# Patient Record
Sex: MALE | Race: WHITE | NOT HISPANIC OR LATINO
[De-identification: names, ages, dates, MRNs, and addresses within clinical notes are randomized per-mention and may not be internally consistent; named-entity substitution may affect disease eponyms.]

---

## 2018-06-05 ENCOUNTER — APPOINTMENT (OUTPATIENT)
Dept: MRI IMAGING | Facility: IMAGING CENTER | Age: 78
End: 2018-06-05

## 2018-06-05 ENCOUNTER — OUTPATIENT (OUTPATIENT)
Dept: OUTPATIENT SERVICES | Facility: HOSPITAL | Age: 78
LOS: 1 days | End: 2018-06-05

## 2018-06-05 DIAGNOSIS — Z98.890 OTHER SPECIFIED POSTPROCEDURAL STATES: ICD-10-CM

## 2018-06-05 DIAGNOSIS — I77.810 THORACIC AORTIC ECTASIA: ICD-10-CM

## 2018-06-05 DIAGNOSIS — Z98.89 OTHER SPECIFIED POSTPROCEDURAL STATES: Chronic | ICD-10-CM

## 2018-06-07 ENCOUNTER — OTHER (OUTPATIENT)
Age: 78
End: 2018-06-07

## 2018-06-08 ENCOUNTER — APPOINTMENT (OUTPATIENT)
Dept: CARDIOTHORACIC SURGERY | Facility: CLINIC | Age: 78
End: 2018-06-08
Payer: MEDICARE

## 2018-08-10 ENCOUNTER — APPOINTMENT (OUTPATIENT)
Dept: CARDIOTHORACIC SURGERY | Facility: CLINIC | Age: 78
End: 2018-08-10
Payer: MEDICARE

## 2018-08-10 VITALS
BODY MASS INDEX: 22.6 KG/M2 | HEIGHT: 67 IN | OXYGEN SATURATION: 95 % | DIASTOLIC BLOOD PRESSURE: 68 MMHG | RESPIRATION RATE: 16 BRPM | WEIGHT: 144 LBS | HEART RATE: 76 BPM | SYSTOLIC BLOOD PRESSURE: 149 MMHG

## 2018-08-10 PROCEDURE — 99213 OFFICE O/P EST LOW 20 MIN: CPT

## 2018-10-11 ENCOUNTER — NON-APPOINTMENT (OUTPATIENT)
Age: 78
End: 2018-10-11

## 2018-10-11 ENCOUNTER — APPOINTMENT (OUTPATIENT)
Dept: CARDIOLOGY | Facility: CLINIC | Age: 78
End: 2018-10-11
Payer: MEDICARE

## 2018-10-11 VITALS
SYSTOLIC BLOOD PRESSURE: 140 MMHG | HEART RATE: 57 BPM | DIASTOLIC BLOOD PRESSURE: 58 MMHG | BODY MASS INDEX: 21.77 KG/M2 | WEIGHT: 139 LBS | OXYGEN SATURATION: 6 %

## 2018-10-11 VITALS — DIASTOLIC BLOOD PRESSURE: 56 MMHG | SYSTOLIC BLOOD PRESSURE: 124 MMHG

## 2018-10-11 DIAGNOSIS — R94.31 ABNORMAL ELECTROCARDIOGRAM [ECG] [EKG]: ICD-10-CM

## 2018-10-11 PROCEDURE — 93000 ELECTROCARDIOGRAM COMPLETE: CPT

## 2018-10-11 PROCEDURE — 99204 OFFICE O/P NEW MOD 45 MIN: CPT

## 2018-10-14 ENCOUNTER — NON-APPOINTMENT (OUTPATIENT)
Age: 78
End: 2018-10-14

## 2018-10-15 ENCOUNTER — MESSAGE (OUTPATIENT)
Age: 78
End: 2018-10-15

## 2018-10-19 ENCOUNTER — APPOINTMENT (OUTPATIENT)
Dept: CARDIOLOGY | Facility: CLINIC | Age: 78
End: 2018-10-19

## 2018-11-13 ENCOUNTER — APPOINTMENT (OUTPATIENT)
Dept: CARDIOLOGY | Facility: CLINIC | Age: 78
End: 2018-11-13
Payer: MEDICARE

## 2018-12-11 ENCOUNTER — LABORATORY RESULT (OUTPATIENT)
Age: 78
End: 2018-12-11

## 2018-12-11 ENCOUNTER — APPOINTMENT (OUTPATIENT)
Dept: CARDIOLOGY | Facility: CLINIC | Age: 78
End: 2018-12-11
Payer: MEDICARE

## 2018-12-11 ENCOUNTER — NON-APPOINTMENT (OUTPATIENT)
Age: 78
End: 2018-12-11

## 2018-12-11 VITALS
HEART RATE: 72 BPM | OXYGEN SATURATION: 97 % | BODY MASS INDEX: 22.87 KG/M2 | DIASTOLIC BLOOD PRESSURE: 80 MMHG | WEIGHT: 146 LBS | SYSTOLIC BLOOD PRESSURE: 162 MMHG

## 2018-12-11 DIAGNOSIS — R25.2 CRAMP AND SPASM: ICD-10-CM

## 2018-12-11 PROCEDURE — 36415 COLL VENOUS BLD VENIPUNCTURE: CPT

## 2018-12-11 PROCEDURE — 93306 TTE W/DOPPLER COMPLETE: CPT

## 2018-12-11 PROCEDURE — 99214 OFFICE O/P EST MOD 30 MIN: CPT | Mod: 25

## 2018-12-11 PROCEDURE — 93000 ELECTROCARDIOGRAM COMPLETE: CPT

## 2018-12-12 ENCOUNTER — MEDICATION RENEWAL (OUTPATIENT)
Age: 78
End: 2018-12-12

## 2018-12-13 ENCOUNTER — MESSAGE (OUTPATIENT)
Age: 78
End: 2018-12-13

## 2018-12-13 LAB
ALBUMIN SERPL ELPH-MCNC: 4.2 G/DL
ALP BLD-CCNC: 88 U/L
ALT SERPL-CCNC: 16 U/L
ANION GAP SERPL CALC-SCNC: 13 MMOL/L
AST SERPL-CCNC: 23 U/L
BASOPHILS # BLD AUTO: 0.02 K/UL
BASOPHILS NFR BLD AUTO: 0.2 %
BILIRUB SERPL-MCNC: 0.2 MG/DL
BUN SERPL-MCNC: 39 MG/DL
CALCIUM SERPL-MCNC: 9.8 MG/DL
CHLORIDE SERPL-SCNC: 108 MMOL/L
CHOLEST SERPL-MCNC: 226 MG/DL
CHOLEST/HDLC SERPL: 4.6 RATIO
CK SERPL-CCNC: 209 U/L
CO2 SERPL-SCNC: 22 MMOL/L
CREAT SERPL-MCNC: 2.01 MG/DL
EOSINOPHIL # BLD AUTO: 0.12 K/UL
EOSINOPHIL NFR BLD AUTO: 1.3 %
GLUCOSE SERPL-MCNC: 92 MG/DL
HCT VFR BLD CALC: 40.4 %
HDLC SERPL-MCNC: 49 MG/DL
HGB BLD-MCNC: 12.8 G/DL
IMM GRANULOCYTES NFR BLD AUTO: 0.2 %
LDLC SERPL CALC-MCNC: 138 MG/DL
LDLC SERPL DIRECT ASSAY-MCNC: 152 MG/DL
LYMPHOCYTES # BLD AUTO: 1.26 K/UL
LYMPHOCYTES NFR BLD AUTO: 13.5 %
MAN DIFF?: NORMAL
MCHC RBC-ENTMCNC: 31.2 PG
MCHC RBC-ENTMCNC: 31.7 GM/DL
MCV RBC AUTO: 98.5 FL
MONOCYTES # BLD AUTO: 0.65 K/UL
MONOCYTES NFR BLD AUTO: 7 %
NEUTROPHILS # BLD AUTO: 7.28 K/UL
NEUTROPHILS NFR BLD AUTO: 77.8 %
PLATELET # BLD AUTO: 160 K/UL
POTASSIUM SERPL-SCNC: 5.6 MMOL/L
PROT SERPL-MCNC: 6.9 G/DL
RBC # BLD: 4.1 M/UL
RBC # FLD: 13.3 %
SODIUM SERPL-SCNC: 143 MMOL/L
T3 SERPL-MCNC: 131 NG/DL
T3RU NFR SERPL: 1.13 INDEX
T4 FREE SERPL-MCNC: 1.2 NG/DL
T4 SERPL-MCNC: 8.2 UG/DL
TRIGL SERPL-MCNC: 193 MG/DL
TSH SERPL-ACNC: 2.5 UIU/ML
WBC # FLD AUTO: 9.35 K/UL

## 2018-12-24 ENCOUNTER — APPOINTMENT (OUTPATIENT)
Dept: CARDIOLOGY | Facility: CLINIC | Age: 78
End: 2018-12-24
Payer: MEDICARE

## 2018-12-24 VITALS
WEIGHT: 143 LBS | BODY MASS INDEX: 22.4 KG/M2 | HEART RATE: 73 BPM | DIASTOLIC BLOOD PRESSURE: 78 MMHG | SYSTOLIC BLOOD PRESSURE: 170 MMHG | OXYGEN SATURATION: 96 %

## 2018-12-24 PROCEDURE — 36415 COLL VENOUS BLD VENIPUNCTURE: CPT

## 2018-12-24 PROCEDURE — 99214 OFFICE O/P EST MOD 30 MIN: CPT

## 2018-12-25 NOTE — HISTORY OF PRESENT ILLNESS
[FreeTextEntry1] : On amlodipine.\par Systolic BPs start in the 160s and after several readings drop to the 130s.\par He denies chest pain, shortness of breath, and palpitations.\par

## 2018-12-28 LAB
ALBUMIN SERPL ELPH-MCNC: 4.1 G/DL
ALP BLD-CCNC: 86 U/L
ALT SERPL-CCNC: 16 U/L
ANION GAP SERPL CALC-SCNC: 11 MMOL/L
AST SERPL-CCNC: 18 U/L
BILIRUB SERPL-MCNC: 0.2 MG/DL
BUN SERPL-MCNC: 37 MG/DL
CALCIUM SERPL-MCNC: 9.3 MG/DL
CHLORIDE SERPL-SCNC: 108 MMOL/L
CO2 SERPL-SCNC: 23 MMOL/L
CREAT SERPL-MCNC: 1.9 MG/DL
POTASSIUM SERPL-SCNC: 5.5 MMOL/L
PROT SERPL-MCNC: 6.6 G/DL
SODIUM SERPL-SCNC: 142 MMOL/L

## 2018-12-30 ENCOUNTER — NON-APPOINTMENT (OUTPATIENT)
Age: 78
End: 2018-12-30

## 2019-01-28 ENCOUNTER — APPOINTMENT (OUTPATIENT)
Dept: CARDIOLOGY | Facility: CLINIC | Age: 79
End: 2019-01-28
Payer: MEDICARE

## 2019-01-28 VITALS
SYSTOLIC BLOOD PRESSURE: 174 MMHG | BODY MASS INDEX: 22.24 KG/M2 | DIASTOLIC BLOOD PRESSURE: 80 MMHG | HEART RATE: 67 BPM | OXYGEN SATURATION: 96 % | WEIGHT: 142 LBS

## 2019-01-28 VITALS — DIASTOLIC BLOOD PRESSURE: 84 MMHG | SYSTOLIC BLOOD PRESSURE: 184 MMHG

## 2019-01-28 PROCEDURE — 99214 OFFICE O/P EST MOD 30 MIN: CPT

## 2019-01-30 ENCOUNTER — MESSAGE (OUTPATIENT)
Age: 79
End: 2019-01-30

## 2019-01-30 RX ORDER — AZILSARTAN KAMEDOXOMIL 80 MG/1
80 TABLET ORAL DAILY
Qty: 45 | Refills: 3 | Status: DISCONTINUED | COMMUNITY
End: 2019-01-30

## 2019-02-06 LAB
ALBUMIN SERPL ELPH-MCNC: 4 G/DL
ALP BLD-CCNC: 79 U/L
ALT SERPL-CCNC: 15 U/L
ANION GAP SERPL CALC-SCNC: 13 MMOL/L
AST SERPL-CCNC: 21 U/L
BILIRUB SERPL-MCNC: 0.3 MG/DL
BUN SERPL-MCNC: 39 MG/DL
CALCIUM SERPL-MCNC: 9.4 MG/DL
CHLORIDE SERPL-SCNC: 107 MMOL/L
CO2 SERPL-SCNC: 22 MMOL/L
CREAT SERPL-MCNC: 1.82 MG/DL
POTASSIUM SERPL-SCNC: 5.1 MMOL/L
PROT SERPL-MCNC: 7 G/DL
SODIUM SERPL-SCNC: 142 MMOL/L

## 2019-02-10 NOTE — HISTORY OF PRESENT ILLNESS
[FreeTextEntry1] : On Kayexalate.\par 1/9 K 6.8, 1/11 - K 6.2, 1/15 - K 5.5 and creat. 1.96. \par Was taking Edarbi-chlorthalidone 40/25, not Edarbi.\par BPs 130-160/65-80.\par ED X 1.5-2 months. \par He denies chest pain, shortness of breath, and palpitations.\par

## 2019-08-15 ENCOUNTER — EMERGENCY (EMERGENCY)
Facility: HOSPITAL | Age: 79
LOS: 1 days | Discharge: HOME | End: 2019-08-15
Attending: EMERGENCY MEDICINE | Admitting: EMERGENCY MEDICINE
Payer: MEDICARE

## 2019-08-15 VITALS
TEMPERATURE: 98 F | RESPIRATION RATE: 16 BRPM | DIASTOLIC BLOOD PRESSURE: 70 MMHG | HEART RATE: 65 BPM | SYSTOLIC BLOOD PRESSURE: 145 MMHG | OXYGEN SATURATION: 99 %

## 2019-08-15 VITALS
OXYGEN SATURATION: 100 % | RESPIRATION RATE: 16 BRPM | HEART RATE: 56 BPM | SYSTOLIC BLOOD PRESSURE: 189 MMHG | DIASTOLIC BLOOD PRESSURE: 89 MMHG

## 2019-08-15 DIAGNOSIS — I71.9 AORTIC ANEURYSM OF UNSPECIFIED SITE, WITHOUT RUPTURE: ICD-10-CM

## 2019-08-15 DIAGNOSIS — Z98.89 OTHER SPECIFIED POSTPROCEDURAL STATES: Chronic | ICD-10-CM

## 2019-08-15 DIAGNOSIS — Z86.79 PERSONAL HISTORY OF OTHER DISEASES OF THE CIRCULATORY SYSTEM: ICD-10-CM

## 2019-08-15 DIAGNOSIS — R10.9 UNSPECIFIED ABDOMINAL PAIN: ICD-10-CM

## 2019-08-15 LAB
ALBUMIN SERPL ELPH-MCNC: 4.3 G/DL — SIGNIFICANT CHANGE UP (ref 3.3–5)
ALP SERPL-CCNC: 73 U/L — SIGNIFICANT CHANGE UP (ref 40–120)
ALT FLD-CCNC: 14 U/L — SIGNIFICANT CHANGE UP (ref 4–41)
ANION GAP SERPL CALC-SCNC: 15 MMO/L — HIGH (ref 7–14)
APPEARANCE UR: CLEAR — SIGNIFICANT CHANGE UP
AST SERPL-CCNC: 17 U/L — SIGNIFICANT CHANGE UP (ref 4–40)
BACTERIA # UR AUTO: NEGATIVE — SIGNIFICANT CHANGE UP
BASOPHILS # BLD AUTO: 0.03 K/UL — SIGNIFICANT CHANGE UP (ref 0–0.2)
BASOPHILS NFR BLD AUTO: 0.2 % — SIGNIFICANT CHANGE UP (ref 0–2)
BILIRUB SERPL-MCNC: 0.3 MG/DL — SIGNIFICANT CHANGE UP (ref 0.2–1.2)
BILIRUB UR-MCNC: NEGATIVE — SIGNIFICANT CHANGE UP
BLOOD UR QL VISUAL: HIGH
BUN SERPL-MCNC: 48 MG/DL — HIGH (ref 7–23)
CALCIUM SERPL-MCNC: 9.2 MG/DL — SIGNIFICANT CHANGE UP (ref 8.4–10.5)
CHLORIDE SERPL-SCNC: 109 MMOL/L — HIGH (ref 98–107)
CO2 SERPL-SCNC: 19 MMOL/L — LOW (ref 22–31)
COLOR SPEC: SIGNIFICANT CHANGE UP
CREAT SERPL-MCNC: 1.82 MG/DL — HIGH (ref 0.5–1.3)
EOSINOPHIL # BLD AUTO: 0.13 K/UL — SIGNIFICANT CHANGE UP (ref 0–0.5)
EOSINOPHIL NFR BLD AUTO: 1 % — SIGNIFICANT CHANGE UP (ref 0–6)
GLUCOSE SERPL-MCNC: 138 MG/DL — HIGH (ref 70–99)
GLUCOSE UR-MCNC: NEGATIVE — SIGNIFICANT CHANGE UP
HCT VFR BLD CALC: 35.8 % — LOW (ref 39–50)
HGB BLD-MCNC: 11.4 G/DL — LOW (ref 13–17)
HYALINE CASTS # UR AUTO: NEGATIVE — SIGNIFICANT CHANGE UP
IMM GRANULOCYTES NFR BLD AUTO: 0.5 % — SIGNIFICANT CHANGE UP (ref 0–1.5)
KETONES UR-MCNC: NEGATIVE — SIGNIFICANT CHANGE UP
LEUKOCYTE ESTERASE UR-ACNC: NEGATIVE — SIGNIFICANT CHANGE UP
LYMPHOCYTES # BLD AUTO: 1.32 K/UL — SIGNIFICANT CHANGE UP (ref 1–3.3)
LYMPHOCYTES # BLD AUTO: 10.4 % — LOW (ref 13–44)
MCHC RBC-ENTMCNC: 30.2 PG — SIGNIFICANT CHANGE UP (ref 27–34)
MCHC RBC-ENTMCNC: 31.8 % — LOW (ref 32–36)
MCV RBC AUTO: 95 FL — SIGNIFICANT CHANGE UP (ref 80–100)
MONOCYTES # BLD AUTO: 0.69 K/UL — SIGNIFICANT CHANGE UP (ref 0–0.9)
MONOCYTES NFR BLD AUTO: 5.4 % — SIGNIFICANT CHANGE UP (ref 2–14)
NEUTROPHILS # BLD AUTO: 10.49 K/UL — HIGH (ref 1.8–7.4)
NEUTROPHILS NFR BLD AUTO: 82.5 % — HIGH (ref 43–77)
NITRITE UR-MCNC: NEGATIVE — SIGNIFICANT CHANGE UP
NRBC # FLD: 0 K/UL — SIGNIFICANT CHANGE UP (ref 0–0)
PH UR: 6.5 — SIGNIFICANT CHANGE UP (ref 5–8)
PLATELET # BLD AUTO: 122 K/UL — LOW (ref 150–400)
PMV BLD: 11.2 FL — SIGNIFICANT CHANGE UP (ref 7–13)
POTASSIUM SERPL-MCNC: 4.2 MMOL/L — SIGNIFICANT CHANGE UP (ref 3.5–5.3)
POTASSIUM SERPL-SCNC: 4.2 MMOL/L — SIGNIFICANT CHANGE UP (ref 3.5–5.3)
PROT SERPL-MCNC: 6.8 G/DL — SIGNIFICANT CHANGE UP (ref 6–8.3)
PROT UR-MCNC: 600 — HIGH
RBC # BLD: 3.77 M/UL — LOW (ref 4.2–5.8)
RBC # FLD: 12.6 % — SIGNIFICANT CHANGE UP (ref 10.3–14.5)
RBC CASTS # UR COMP ASSIST: HIGH (ref 0–?)
SODIUM SERPL-SCNC: 143 MMOL/L — SIGNIFICANT CHANGE UP (ref 135–145)
SP GR SPEC: 1.01 — SIGNIFICANT CHANGE UP (ref 1–1.04)
SQUAMOUS # UR AUTO: SIGNIFICANT CHANGE UP
UROBILINOGEN FLD QL: NORMAL — SIGNIFICANT CHANGE UP
WBC # BLD: 12.72 K/UL — HIGH (ref 3.8–10.5)
WBC # FLD AUTO: 12.72 K/UL — HIGH (ref 3.8–10.5)
WBC UR QL: SIGNIFICANT CHANGE UP (ref 0–?)

## 2019-08-15 PROCEDURE — 93308 TTE F-UP OR LMTD: CPT | Mod: 26

## 2019-08-15 PROCEDURE — 74176 CT ABD & PELVIS W/O CONTRAST: CPT | Mod: 26,59

## 2019-08-15 PROCEDURE — 76775 US EXAM ABDO BACK WALL LIM: CPT | Mod: 26

## 2019-08-15 PROCEDURE — 74174 CTA ABD&PLVS W/CONTRAST: CPT | Mod: 26

## 2019-08-15 PROCEDURE — 71275 CT ANGIOGRAPHY CHEST: CPT | Mod: 26

## 2019-08-15 PROCEDURE — 99285 EMERGENCY DEPT VISIT HI MDM: CPT | Mod: 25,GC

## 2019-08-15 RX ORDER — SODIUM CHLORIDE 9 MG/ML
1000 INJECTION INTRAMUSCULAR; INTRAVENOUS; SUBCUTANEOUS ONCE
Refills: 0 | Status: COMPLETED | OUTPATIENT
Start: 2019-08-15 | End: 2019-08-15

## 2019-08-15 RX ORDER — ONDANSETRON 8 MG/1
4 TABLET, FILM COATED ORAL ONCE
Refills: 0 | Status: COMPLETED | OUTPATIENT
Start: 2019-08-15 | End: 2019-08-15

## 2019-08-15 RX ORDER — MORPHINE SULFATE 50 MG/1
4 CAPSULE, EXTENDED RELEASE ORAL ONCE
Refills: 0 | Status: DISCONTINUED | OUTPATIENT
Start: 2019-08-15 | End: 2019-08-15

## 2019-08-15 RX ORDER — OXYCODONE AND ACETAMINOPHEN 5; 325 MG/1; MG/1
1 TABLET ORAL ONCE
Refills: 0 | Status: DISCONTINUED | OUTPATIENT
Start: 2019-08-15 | End: 2019-08-15

## 2019-08-15 RX ORDER — KETOROLAC TROMETHAMINE 30 MG/ML
30 SYRINGE (ML) INJECTION ONCE
Refills: 0 | Status: DISCONTINUED | OUTPATIENT
Start: 2019-08-15 | End: 2019-08-15

## 2019-08-15 RX ADMIN — SODIUM CHLORIDE 1000 MILLILITER(S): 9 INJECTION INTRAMUSCULAR; INTRAVENOUS; SUBCUTANEOUS at 15:07

## 2019-08-15 RX ADMIN — MORPHINE SULFATE 4 MILLIGRAM(S): 50 CAPSULE, EXTENDED RELEASE ORAL at 09:56

## 2019-08-15 RX ADMIN — Medication 30 MILLIGRAM(S): at 09:11

## 2019-08-15 RX ADMIN — Medication 30 MILLIGRAM(S): at 08:28

## 2019-08-15 RX ADMIN — OXYCODONE AND ACETAMINOPHEN 1 TABLET(S): 5; 325 TABLET ORAL at 14:07

## 2019-08-15 RX ADMIN — ONDANSETRON 4 MILLIGRAM(S): 8 TABLET, FILM COATED ORAL at 08:28

## 2019-08-15 RX ADMIN — OXYCODONE AND ACETAMINOPHEN 1 TABLET(S): 5; 325 TABLET ORAL at 12:49

## 2019-08-15 RX ADMIN — MORPHINE SULFATE 4 MILLIGRAM(S): 50 CAPSULE, EXTENDED RELEASE ORAL at 09:10

## 2019-08-15 RX ADMIN — SODIUM CHLORIDE 1000 MILLILITER(S): 9 INJECTION INTRAMUSCULAR; INTRAVENOUS; SUBCUTANEOUS at 09:11

## 2019-08-15 RX ADMIN — SODIUM CHLORIDE 1000 MILLILITER(S): 9 INJECTION INTRAMUSCULAR; INTRAVENOUS; SUBCUTANEOUS at 15:09

## 2019-08-15 RX ADMIN — SODIUM CHLORIDE 1000 MILLILITER(S): 9 INJECTION INTRAMUSCULAR; INTRAVENOUS; SUBCUTANEOUS at 14:09

## 2019-08-15 RX ADMIN — SODIUM CHLORIDE 1000 MILLILITER(S): 9 INJECTION INTRAMUSCULAR; INTRAVENOUS; SUBCUTANEOUS at 08:28

## 2019-08-15 NOTE — ED PROVIDER NOTE - NSFOLLOWUPINSTRUCTIONS_ED_ALL_ED_FT
Follow up with your PCP in 24-48 hours.   Call urology to make follow up appointment.   May take Tylenol and Motrin as directed on the bottle for pain control.   Return to the ER if you develop any new or worsening symptoms such as fever, chest pain, shortness of breath, numbness, weakness, abdominal pain, nausea, vomiting, or visual changes. Follow up with your PCP tomorrow for repeat labs.   Call Dr. Corral to make follow up appointment.   Take daily aspirin as instructed.   May take Tylenol and Motrin as directed on the bottle for pain control.   Return to the ER if you develop any new or worsening symptoms such as fever, chest pain, shortness of breath, numbness, weakness, abdominal pain, nausea, vomiting, or visual changes.

## 2019-08-15 NOTE — PROGRESS NOTE ADULT - ASSESSMENT
pt with severe pain now pain free possible stone has microscopic hematuria no stones on ct  has anneurysm inc in size from 4.5 to 5.2

## 2019-08-15 NOTE — ED PROVIDER NOTE - NS ED ROS FT
ROS:  GENERAL: No fever, no chills  EYES: no change in vision  HEENT: no trouble swallowing, no trouble speaking  CARDIAC: no chest pain  PULMONARY: no cough, no shortness of breath  GI: +left flank pain. no abdominal pain, no nausea, no vomiting, no diarrhea, no constipation  : No dysuria, no frequency, no change in appearance, or odor of urine  SKIN: no rashes  NEURO: no headache, no weakness  MSK: No joint pain

## 2019-08-15 NOTE — ED PROVIDER NOTE - NSFOLLOWUPCLINICS_GEN_ALL_ED_FT
[facility]  Urology  [address] [place] [address] - [address] [place] [address]  Phone: [phone]  Fax:   Follow Up Time:

## 2019-08-15 NOTE — ED PROCEDURE NOTE - PROCEDURE ADDITIONAL DETAILS
ED focused sono of retroperitoneal aorta 34691  Indication abd/flank pain    Aorta scannined in trasnverse and sagittal planes.    Proximal aorta   3.87    cm  Mid aorta       4.19         cm  Distal aorta      1.9       cm    Impression: (+) AAA.  See report in chart.  No aorta intima flaps seen - please note ultrasound is not the best test to evaluate for aortic dissection.  Monicas
Focused ED Transthoracic echo 94167 - indication (  flank pain assess Aorta problem    )    Grey scale imaging obtained in four views ( parasternal long, parasternal short, apical and subxiphoid)    No pericardial effusion noted.  No evidence of cardiac tamponade  LV contractility - normal.  RV normal size.  IVC distended but ~50% collapsible  No MR seen.  mild-mod aortic insufficiency.   Aortic root not dilated (2.5cm).    Impression: no pericardial effusion, normal contractility, RV normal size.   Mild-mod aortic insufficiency.  Aortic root not dilated.  Dexeus.

## 2019-08-15 NOTE — PROGRESS NOTE ADULT - SUBJECTIVE AND OBJECTIVE BOX
Subjective: Patient is a 79y old  Male who presents with a chief complaint of  back  pain  nausea vomiting  hx aortic anneurysm repair hypertension renal insufficiency  pain over several hours comes and goes     PAST MEDICAL & SURGICAL HISTORY:  H/O hyperlipidemia  H/O: HTN (hypertension)  History of appendectomy      Allergies    No Known Allergies    Intolerances        MEDICATIONS  (STANDING):    MEDICATIONS  (PRN):      CONSTITUTIONAL: No fever, weight loss, or fatigue  EYES: No eye pain, visual disturbances, or discharge  ENMT:  No difficulty hearing, tinnitus, vertigo; No sinus or throat pain  NECK: No pain or stiffness  BREASTS: No pain, masses, or nipple discharge  RESPIRATORY: No cough, wheezing, chills or hemoptysis; No shortness of breath  CARDIOVASCULAR: No chest pain, palpitations, dizziness, or leg swelling  GASTROINTESTINAL: No abdominal or epigastric pain. No nausea, vomiting, or hematemesis; No diarrhea or constipation. No melena or hematochezia.  GENITOURINARY: No dysuria, frequency, hematuria, or incontinence  NEUROLOGICAL: No headaches, memory loss, loss of strength, numbness, or tremors  SKIN: No itching, burning, rashes, or lesions   LYMPH NODES: No enlarged glands  ENDOCRINE: No heat or cold intolerance; No hair loss  MUSCULOSKELETAL: No joint pain or swelling; No muscle, back, or extremity pain  PSYCHIATRIC: No depression, anxiety, mood swings, or difficulty sleeping  HEME/LYMPH: No easy bruising, or bleeding gums  ALLERY AND IMMUNOLOGIC: No hives or eczema      PHYSICAL EXAM:    GENERAL: Comfortable, no acute distress   HEAD:  Normocephalic, atraumatic  EYES: EOMI, PERRLA  HEENT: Moist mucous membranes  NECK: Supple, No JVD  NERVOUS SYSTEM:  Alert & Oriented X3, Motor Strength 5/5 B/L upper and lower extremities  CHEST/LUNG: Clear to auscultation bilaterally  HEART: Regular rate and rhythm  ABDOMEN: Soft, Nontender, Nondistended, Bowel sounds present  GENITOURINARY: Voiding, no palpable bladder  EXTREMITIES:   No clubbing, cyanosis, or edema  MUSCULOSKELTAL- No muscle tenderness, no joint tenderness  SKIN-no rash            Vital Signs Last 24 Hrs  T(C): 36.6 (15 Aug 2019 11:51), Max: 36.7 (15 Aug 2019 08:30)  T(F): 97.8 (15 Aug 2019 11:51), Max: 98 (15 Aug 2019 08:30)  HR: 60 (15 Aug 2019 11:51) (54 - 60)  BP: 147/53 (15 Aug 2019 11:51) (147/53 - 220/72)  BP(mean): --  RR: 18 (15 Aug 2019 11:51) (16 - 18)  SpO2: 97% (15 Aug 2019 11:51) (97% - 100%)      LABS:    08-15    143  |  109<H>  |  48<H>  ----------------------------<  138<H>  4.2   |  19<L>  |  1.82<H>    Ca    9.2      15 Aug 2019 08:20    TPro  6.8  /  Alb  4.3  /  TBili  0.3  /  DBili  x   /  AST  17  /  ALT  14  /  AlkPhos  73  08-15    CBC Full  -  ( 15 Aug 2019 08:20 )  WBC Count : 12.72 K/uL  RBC Count : 3.77 M/uL  Hemoglobin : 11.4 g/dL  Hematocrit : 35.8 %  Platelet Count - Automated : 122 K/uL  Mean Cell Volume : 95.0 fL  Mean Cell Hemoglobin : 30.2 pg  Mean Cell Hemoglobin Concentration : 31.8 %  Auto Neutrophil # : 10.49 K/uL  Auto Lymphocyte # : 1.32 K/uL  Auto Monocyte # : 0.69 K/uL  Auto Eosinophil # : 0.13 K/uL  Auto Basophil # : 0.03 K/uL  Auto Neutrophil % : 82.5 %  Auto Lymphocyte % : 10.4 %  Auto Monocyte % : 5.4 %  Auto Eosinophil % : 1.0 %  Auto Basophil % : 0.2 %    Urinalysis Basic - ( 15 Aug 2019 10:10 )    Color: LIGHT YELLOW / Appearance: CLEAR / S.013 / pH: 6.5  Gluc: NEGATIVE / Ketone: NEGATIVE  / Bili: NEGATIVE / Urobili: NORMAL   Blood: MODERATE / Protein: 600 / Nitrite: NEGATIVE   Leuk Esterase: NEGATIVE / RBC: 26-50 / WBC 3-5   Sq Epi: OCC / Non Sq Epi: x / Bacteria: NEGATIVE      LIVER FUNCTIONS - ( 15 Aug 2019 08:20 )  Alb: 4.3 g/dL / Pro: 6.8 g/dL / ALK PHOS: 73 u/L / ALT: 14 u/L / AST: 17 u/L / GGT: x                 EKG:    Imaging Studies:    Impression / Plan:

## 2019-08-15 NOTE — ED PROVIDER NOTE - CLINICAL SUMMARY MEDICAL DECISION MAKING FREE TEXT BOX
Left flank pain c/w nephrolithiasis. No concerning features to suggest acute surgical abdomen or AAA. Pt well appearing, HD stable. Pain control, CT, UA, reassess.

## 2019-08-15 NOTE — ED PROVIDER NOTE - PHYSICAL EXAMINATION
Gen: AAOx3, non-toxic  Head: NCAT  HEENT: EOMI, oral mucosa moist, normal conjunctiva  Lung: CTAB, no respiratory distress, no wheezes/rhonchi/rales B/L, speaking in full sentences  CV: RRR, no murmurs, rubs or gallops  Abd: soft, NTND, no pulsatile mass, no guarding, no CVA tenderness  MSK: no visible deformities  Neuro: No focal sensory or motor deficits, normal CN exam   Skin: Warm, well perfused, no rash  Psych: normal affect.     ~Ambrocio Reynoso PGY2

## 2019-08-15 NOTE — ED ADULT NURSE NOTE - OBJECTIVE STATEMENT
79 year old male presents with left flank pain since this am PIV placed labs drawn and sent medicated as ordered Plan of care discussed with pt and family   PMH: HTN

## 2019-08-15 NOTE — CONSULT NOTE ADULT - ASSESSMENT
79M hx HTN, HLD, thoracic aneurysm s/p repair with Dr. Corral (2015) who presents with likely nephrolithiasis, incidentally found to have interval increase in descending aorta at level of hiatus and peripheral thrombosis of the aortic wall, no rupture    - No vascular intervention indicated  - Continue aspirin  - Follow up with Dr. Corral outpatient with CTA results    d/w fellow Dr. Faith on behalf of Dr. Torito Faith, PGY-2  Vascular Surgery (C Team)  [id number] with questions

## 2019-08-15 NOTE — ED PROVIDER NOTE - PROGRESS NOTE DETAILS
pt now mentioning history of thoracic aortic aneurysm s/p TEVAR. will plan for AAA workup now. pt's pain better controlled. will start with non-con as pt is reporting "kidney failure" after last contrast exposure and current Cr is 1.8. if no kidney stone and CT equivocal for AAA, will consider CTA. spoke to radiology, no stone seen on initial read and current scan reportedly looks grossly similar to scan from 2016 regarding aneurysm. pt feeling better. will reassess and obtain vascular consult. spoke to radiology, no stone seen on initial read and current scan reportedly looks grossly similar to scan from 2016 regarding aneurysm. pt feeling better. will reassess and consider vascular consult. pt pain free. pain resolved. blood in urine. CT aneurysm findings unchanged from prior study. symptoms c/w passed stone. flank pain returned. percocet and reassess. will consider cdu. pt remains asymptomatic. per vascular surgery, the pt's CTA is unchanged and he is cleared to f/u with Dr. Corral on daily aspirin.

## 2019-08-15 NOTE — CONSULT NOTE ADULT - SUBJECTIVE AND OBJECTIVE BOX
General Surgery Consult  Consulting surgical team: Vascular Surgery  Consulting attending: Suman Ugarte    HPI: 79M hx HTN, HLD, thoracic aneurysm s/p repair with Dr. Corral () who presents with back pain x1 day. Patient reports severe flank pain on his left side starting this morning, which was so severe he came to the ED for evaluation. Denies fevers/chills, reports his usual po intake. He underwent a CT scan to evaluate for nephrolithiasis and was incidentally found to have dilation of his abdominal aorta. A CTA was obtained for further characterization, which demonstrated an interval increase of size in descending aorta at the level of the hiatus to 5.5 cm from 4.2 cm and ectasia and peripheral thrombosis of the abdominal aorta between the celiac trunk and SMA measuring 5 x 3.1 cm. Vascular surgery was consulted for further recommendations. Patient reports he last saw Dr. Corral 1 year ago for surveillance.       PAST MEDICAL HISTORY:  H/O hyperlipidemia  H/O: HTN (hypertension)      PAST SURGICAL HISTORY:  History of appendectomy      MEDICATIONS:      ALLERGIES:  No Known Allergies      VITALS & I/Os:  Vital Signs Last 24 Hrs  T(C): 36.9 (15 Aug 2019 18:58), Max: 36.9 (15 Aug 2019 16:00)  T(F): 98.5 (15 Aug 2019 18:58), Max: 98.5 (15 Aug 2019 16:00)  HR: 65 (15 Aug 2019 18:58) (54 - 65)  BP: 145/70 (15 Aug 2019 18:58) (135/74 - 220/72)  BP(mean): --  RR: 16 (15 Aug 2019 18:58) (16 - 18)  SpO2: 99% (15 Aug 2019 18:58) (97% - 100%)    I&O's Summary      PHYSICAL EXAM:  General: No acute distress  Respiratory: Nonlabored  Cardiovascular: RRR  Abdominal: Soft, nondistended, nontender, no flank tenderness  Extremities: Warm  Vasc: +2 DP pulses    LABS:                        11.4   12.72 )-----------( 122      ( 15 Aug 2019 08:20 )             35.8     08-15    143  |  109<H>  |  48<H>  ----------------------------<  138<H>  4.2   |  19<L>  |  1.82<H>    Ca    9.2      15 Aug 2019 08:20    TPro  6.8  /  Alb  4.3  /  TBili  0.3  /  DBili  x   /  AST  17  /  ALT  14  /  AlkPhos  73  08-15    Lactate:      Urinalysis Basic - ( 15 Aug 2019 10:10 )    Color: LIGHT YELLOW / Appearance: CLEAR / S.013 / pH: 6.5  Gluc: NEGATIVE / Ketone: NEGATIVE  / Bili: NEGATIVE / Urobili: NORMAL   Blood: MODERATE / Protein: 600 / Nitrite: NEGATIVE   Leuk Esterase: NEGATIVE / RBC: 26-50 / WBC 3-5   Sq Epi: OCC / Non Sq Epi: x / Bacteria: NEGATIVE        IMAGING:

## 2019-08-15 NOTE — ED PROVIDER NOTE - ATTENDING CONTRIBUTION TO CARE
I performed a face to face bedside interview with patient regarding history of present illness, review of symptoms and past medical history. I completed an independent physical exam.  I have discussed patient's plan of care.   I agree with note as stated above, having amended the EMR as needed to reflect my findings. I have discussed the assessment and plan of care.  This includes during the time I functioned as the attending physician for this patient.  Attending Contribution to Care: agree with plan of resident. HTN and HLD p/w left flank pain since early this morning. Described as sharp, moderate, intermittent, without provoking or palliating factors. Denies any other symptoms. ct non con neg for colic. wlil risk cr and r/o worsening aneurysm. pt pending cta read at time of sign out. only initial pain, requiring morphine. otherwise well appearing.

## 2019-08-15 NOTE — ED ADULT TRIAGE NOTE - CHIEF COMPLAINT QUOTE
Pt. w/ hx. HTN , HLD arrives c/o left sided flank pain since earlier this morning. Pt. denies any urinary symptoms. Pt. appears uncomfortable in triage, crying in pain.  unable to obtain temp. in triage.

## 2019-08-15 NOTE — ED PROVIDER NOTE - OBJECTIVE STATEMENT
79M with PMH of HTN and HLD p/w left flank pain since early this morning. Described as sharp, moderate, intermittent, without provoking or palliating factors. Denies any other symptoms. Denies history of kidney stones or of similar pain.

## 2019-08-16 LAB
BACTERIA UR CULT: SIGNIFICANT CHANGE UP
SPECIMEN SOURCE: SIGNIFICANT CHANGE UP

## 2019-09-10 ENCOUNTER — APPOINTMENT (OUTPATIENT)
Dept: UROLOGY | Facility: CLINIC | Age: 79
End: 2019-09-10
Payer: MEDICARE

## 2019-09-10 VITALS
SYSTOLIC BLOOD PRESSURE: 172 MMHG | HEART RATE: 68 BPM | OXYGEN SATURATION: 98 % | DIASTOLIC BLOOD PRESSURE: 73 MMHG | TEMPERATURE: 98.5 F

## 2019-09-10 DIAGNOSIS — N28.1 CYST OF KIDNEY, ACQUIRED: ICD-10-CM

## 2019-09-10 PROCEDURE — 99204 OFFICE O/P NEW MOD 45 MIN: CPT

## 2019-09-10 PROCEDURE — 88112 CYTOPATH CELL ENHANCE TECH: CPT | Mod: 26

## 2019-09-11 ENCOUNTER — APPOINTMENT (OUTPATIENT)
Dept: CARDIOTHORACIC SURGERY | Facility: CLINIC | Age: 79
End: 2019-09-11
Payer: MEDICARE

## 2019-09-11 VITALS
RESPIRATION RATE: 13 BRPM | OXYGEN SATURATION: 99 % | HEIGHT: 67 IN | SYSTOLIC BLOOD PRESSURE: 171 MMHG | BODY MASS INDEX: 22.6 KG/M2 | WEIGHT: 144 LBS | HEART RATE: 64 BPM | DIASTOLIC BLOOD PRESSURE: 75 MMHG | TEMPERATURE: 98 F

## 2019-09-11 LAB — URINE CYTOLOGY: NORMAL

## 2019-09-11 PROCEDURE — 99214 OFFICE O/P EST MOD 30 MIN: CPT

## 2019-09-13 ENCOUNTER — NON-APPOINTMENT (OUTPATIENT)
Age: 79
End: 2019-09-13

## 2019-09-13 ENCOUNTER — APPOINTMENT (OUTPATIENT)
Dept: CARDIOLOGY | Facility: CLINIC | Age: 79
End: 2019-09-13
Payer: MEDICARE

## 2019-09-13 VITALS
HEIGHT: 67 IN | OXYGEN SATURATION: 97 % | SYSTOLIC BLOOD PRESSURE: 132 MMHG | HEART RATE: 68 BPM | BODY MASS INDEX: 22.6 KG/M2 | WEIGHT: 144 LBS | DIASTOLIC BLOOD PRESSURE: 58 MMHG

## 2019-09-13 PROCEDURE — 93000 ELECTROCARDIOGRAM COMPLETE: CPT

## 2019-09-13 PROCEDURE — 99214 OFFICE O/P EST MOD 30 MIN: CPT

## 2019-09-13 NOTE — HISTORY OF PRESENT ILLNESS
[FreeTextEntry1] : 79 year old male s/p TEVAR 8/20/15 presents for a follow up visit with repeat diagnostic imaging.  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 \par \par The patient denies chest pain, shortness of breath, cough, hemoptysis, fever, palpitations, syncope, URI  or recent illness.\par \par

## 2019-09-13 NOTE — DATA REVIEWED
[FreeTextEntry1] : CTA chest, abdomen and pelvis 5/18/16 revealed: no endoleak, descending thoracic aorta - stable dilatation. Incidental finding includes benign hemangiomas or perfusion abnormalities. \par \par \par CT Scan of the Chest, Abdomen and Pelvis (without contrast): 7/23/2018\par -S/P Endovascular stent graft repair extending from the distal ascending aorta \par -In the area of the stent graft repair the native aorta measures 5.4 cm in diameter and is without interval change compared to 2016\par -The native aorta is 5.4 cm\par -Below the stent graft repair\par -Descending thoracic aorta: 4.8 cm, previously 4.7 cm\par -Minimal interval increase in saccular suprarenal intra-abdominal aortic aneurysm, 4.4 cm, previously 4.3 cm\par \par CT chest abd pelvis W/WO IC 8/15/19: aortic root and ascending aorta are normal in diameter, patient is s/p stent graft repair of descending aorta without any evidence of leak or fluid collection, descending aorta max diameter is 5.5cm at the hiatus, previously 4.2cm, 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

## 2019-09-13 NOTE — CONSULT LETTER
[Dear  ___] : Dear  [unfilled], [Consult Letter:] : I had the pleasure of evaluating your patient, [unfilled]. [Please see my note below.] : Please see my note below. [Sincerely,] : Sincerely, [FreeTextEntry1] : \par \par \par I had the pleasure of seeing your patient, MURALI MARIE, in my office today. \par \par We take a multidisciplinary team approach to patient care and consider you, the physician, an extension of our team. We will maintain an open line of communication with you throughout your patient's treatment course.  \par \par As you recall, he is a 79 year year old male status post TEVAR on August 20, 2015. The patient presents to the office today for a routine follow up visit with repeat diagnostic imaging. I have enclosed a copy for your records.\par \par The surgical repair is intact and stable. Therefore, I have recommended that the patient will follow up in the [facility] in _____ with a ______to monitor his surgical repair. My office will assist the patient with his upcoming appointment and I will update you on his  progress at that time.\par \par I have discussed with the patient that we will continue to monitor his aortic pathology closely at the [facility], that encompasses the entire health care system and is one of the largest in the [place] at this point.\par \par I appreciate the opportunity to care for your patient at the [facility] based at [facility]. If there are any questions or concerns, please call me directly at [phone]. \par \par \par \par  [FreeTextEntry2] : Dr Lang Mcconnell\par [address] [place] [address]\[place] [address] \par \par  [FreeTextEntry3] : \par Marlo Corral M.D.\par Professor of Cardiovascular and Thoracic Surgery\par Minimally Invasive Valve Surgeon\par Director of Aortic Surgery, [facility]\par Cell: [phone]\par Email: [email or web address] \par \par [facility]:\par [address], [address] Floor, [place] [address]\par Office: [phone]\par Fax: [phone]\par \par [facility]:\par Department of Cardiovascular and Thoracic Surgery\par [address] [place] [address], [address]\par Office: [phone]\par Fax: [phone]\par \par Practice Manager: Ms. Lora Anton\par Email: [email or web address]\par Phone: [phone]\par \par

## 2019-09-13 NOTE — ASSESSMENT
[FreeTextEntry1] : 79 year old male s/p TEVAR 8/20/15 presents for a follow up visit with repeat diagnostic imaging. \par \par The patient's medical records and diagnostic images were reviewed at the time of this office visit, and the following recommendation was made. \par \par Plan:\par 1. Continue medication regimen\par 2. Follow up with PCP and cardiologist\par 3. BP control- I have recommended the patient to monitor his blood pressure closely. I have also advised the patient to take daily blood pressures at home and adhere to medication regimen.\par 4. Return to the [facility] in 6 months with Non Contrast CT Scan of Chest\par \par \par

## 2019-09-13 NOTE — PHYSICAL EXAM
[Sclera] : the sclera and conjunctiva were normal [PERRL With Normal Accommodation] : pupils were equal in size, round, and reactive to light [Neck Appearance] : the appearance of the neck was normal [Extraocular Movements] : extraocular movements were intact [Neck Cervical Mass (___cm)] : no neck mass was observed [Thyroid Diffuse Enlargement] : the thyroid was not enlarged [Jugular Venous Distention Increased] : there was no jugular-venous distention [] : no respiratory distress [Thyroid Nodule] : there were no palpable thyroid nodules [Auscultation Breath Sounds / Voice Sounds] : lungs were clear to auscultation bilaterally [Heart Rate And Rhythm] : heart rate was normal and rhythm regular [Heart Sounds] : normal S1 and S2 [Heart Sounds Gallop] : no gallops [Heart Sounds Pericardial Friction Rub] : no pericardial rub [Murmurs] : no murmurs [Examination Of The Chest] : the chest was normal in appearance [Breast Appearance] : normal in appearance [Abdomen Soft] : soft [Bowel Sounds] : normal bowel sounds [Cervical Lymph Nodes Enlarged Posterior Bilaterally] : posterior cervical [Cervical Lymph Nodes Enlarged Anterior Bilaterally] : anterior cervical [No CVA Tenderness] : no ~M costovertebral angle tenderness [Involuntary Movements] : no involuntary movements were seen [Skin Color & Pigmentation] : normal skin color and pigmentation [No Focal Deficits] : no focal deficits [Skin Turgor] : normal skin turgor [Impaired Insight] : insight and judgment were intact [Oriented To Time, Place, And Person] : oriented to person, place, and time [Right Carotid Bruit] : no bruit heard over the right carotid [Left Carotid Bruit] : no bruit heard over the left carotid

## 2019-09-13 NOTE — PROCEDURE
[FreeTextEntry1] : \par Dr. Corral discussed activity restrictions with the patient, and would advise exercise at a moderate amount with no heavy lifting over one third of body weight, and avoiding heart rates that exceed 140 beats per minute. Every patient must abstain from tobacco and illicit drug use, especially stimulants such as cocaine or methamphetamine. The patient was also counseled on maintaining a healthy heart diet, and losing any excessive weight as this also put undue stress on both the aorta and entire cardiovascular system. Patient was counseled on the importance of medication adherence for blood pressure control, as hypertension is a significant risk factor associated with aortic dissections. First degree family members should be screened for bicuspid valve disease, and ascending aortic aneurysms.\par \par

## 2019-09-14 RX ORDER — HYDROCHLOROTHIAZIDE 25 MG/1
25 TABLET ORAL DAILY
Qty: 90 | Refills: 3 | Status: DISCONTINUED | COMMUNITY
Start: 2019-01-30 | End: 2019-09-14

## 2019-09-14 RX ORDER — PITAVASTATIN CALCIUM 4.18 MG/1
4 TABLET, FILM COATED ORAL
Qty: 90 | Refills: 2 | Status: ACTIVE | COMMUNITY

## 2019-09-14 NOTE — HISTORY OF PRESENT ILLNESS
[FreeTextEntry1] : Edarbi was stopped because of potassium problems.\par Hydrochlorothiazide was stopped because of kidney and/or potassium problems.  \par He is on by Bystolic 5 mg twice daily and amlodipine 5 mg daily.\par Livalo was increased to 4 q.d.\par In the morning, his systolic blood pressures are in the 150s.  Later in the day, his systolic blood pressure is approximately 125 mm Hg, and by 5:00-6:00 in the afternoon his systolic blood pressures are approximately 119 mmHg. \par

## 2019-12-05 ENCOUNTER — APPOINTMENT (OUTPATIENT)
Dept: CARDIOLOGY | Facility: CLINIC | Age: 79
End: 2019-12-05
Payer: MEDICARE

## 2019-12-05 ENCOUNTER — NON-APPOINTMENT (OUTPATIENT)
Age: 79
End: 2019-12-05

## 2019-12-05 VITALS
SYSTOLIC BLOOD PRESSURE: 160 MMHG | OXYGEN SATURATION: 99 % | HEART RATE: 64 BPM | DIASTOLIC BLOOD PRESSURE: 78 MMHG | WEIGHT: 142 LBS | BODY MASS INDEX: 22.24 KG/M2

## 2019-12-05 VITALS — DIASTOLIC BLOOD PRESSURE: 74 MMHG | SYSTOLIC BLOOD PRESSURE: 166 MMHG

## 2019-12-05 PROCEDURE — 99214 OFFICE O/P EST MOD 30 MIN: CPT

## 2019-12-05 PROCEDURE — 93306 TTE W/DOPPLER COMPLETE: CPT

## 2019-12-05 PROCEDURE — 93000 ELECTROCARDIOGRAM COMPLETE: CPT

## 2019-12-06 ENCOUNTER — RESULT CHARGE (OUTPATIENT)
Age: 79
End: 2019-12-06

## 2019-12-07 NOTE — HISTORY OF PRESENT ILLNESS
[FreeTextEntry1] : PCP: Dr. Lang Borrego (GI).\par He denies chest pain, shortness of breath, and palpitations.\par He is currently taking Bystolic 10 daily (as per his discussions with Dr. Mcconnell.\par His systolic blood pressures generally run in the 120s at home.

## 2019-12-11 ENCOUNTER — APPOINTMENT (OUTPATIENT)
Dept: CARDIOLOGY | Facility: CLINIC | Age: 79
End: 2019-12-11

## 2020-02-20 ENCOUNTER — APPOINTMENT (OUTPATIENT)
Dept: UROLOGY | Facility: CLINIC | Age: 80
End: 2020-02-20
Payer: MEDICARE

## 2020-02-20 DIAGNOSIS — N40.1 BENIGN PROSTATIC HYPERPLASIA WITH LOWER URINARY TRACT SYMPMS: ICD-10-CM

## 2020-02-20 DIAGNOSIS — N13.8 BENIGN PROSTATIC HYPERPLASIA WITH LOWER URINARY TRACT SYMPMS: ICD-10-CM

## 2020-02-20 PROCEDURE — 99214 OFFICE O/P EST MOD 30 MIN: CPT | Mod: 25

## 2020-02-20 PROCEDURE — 51798 US URINE CAPACITY MEASURE: CPT

## 2020-02-20 NOTE — PHYSICAL EXAM
[General Appearance - Well Developed] : well developed [General Appearance - Well Nourished] : well nourished [Normal Appearance] : normal appearance [General Appearance - In No Acute Distress] : no acute distress [Well Groomed] : well groomed [Abdomen Tenderness] : non-tender [Costovertebral Angle Tenderness] : no ~M costovertebral angle tenderness [Abdomen Soft] : soft [Urethral Meatus] : meatus normal [Urinary Bladder Findings] : the bladder was normal on palpation [Testes Mass (___cm)] : there were no testicular masses [Scrotum] : the scrotum was normal [No Prostate Nodules] : no prostate nodules [Edema] : no peripheral edema [] : no respiratory distress [Respiration, Rhythm And Depth] : normal respiratory rhythm and effort [Exaggerated Use Of Accessory Muscles For Inspiration] : no accessory muscle use [Oriented To Time, Place, And Person] : oriented to person, place, and time [Affect] : the affect was normal [Mood] : the mood was normal [Not Anxious] : not anxious [Normal Station and Gait] : the gait and station were normal for the patient's age [No Focal Deficits] : no focal deficits [No Palpable Adenopathy] : no palpable adenopathy

## 2020-02-21 LAB
APPEARANCE: CLEAR
BACTERIA: NEGATIVE
BILIRUBIN URINE: NEGATIVE
BLOOD URINE: ABNORMAL
COLOR: YELLOW
GLUCOSE QUALITATIVE U: NEGATIVE
HYALINE CASTS: 2 /LPF
KETONES URINE: NEGATIVE
LEUKOCYTE ESTERASE URINE: NEGATIVE
MICROSCOPIC-UA: NORMAL
NITRITE URINE: NEGATIVE
PH URINE: 7
PROTEIN URINE: ABNORMAL
PSA FREE FLD-MCNC: 42 %
PSA FREE SERPL-MCNC: 1.41 NG/ML
PSA SERPL-MCNC: 3.34 NG/ML
RED BLOOD CELLS URINE: 32 /HPF
SPECIFIC GRAVITY URINE: 1.02
SQUAMOUS EPITHELIAL CELLS: 3 /HPF
URINE COMMENTS: NORMAL
UROBILINOGEN URINE: NORMAL
WHITE BLOOD CELLS URINE: 1 /HPF

## 2020-02-27 ENCOUNTER — OUTPATIENT (OUTPATIENT)
Dept: OUTPATIENT SERVICES | Facility: HOSPITAL | Age: 80
LOS: 1 days | End: 2020-02-27
Payer: MEDICARE

## 2020-02-27 ENCOUNTER — APPOINTMENT (OUTPATIENT)
Dept: CT IMAGING | Facility: CLINIC | Age: 80
End: 2020-02-27
Payer: MEDICARE

## 2020-02-27 DIAGNOSIS — Z00.8 ENCOUNTER FOR OTHER GENERAL EXAMINATION: ICD-10-CM

## 2020-02-27 DIAGNOSIS — Z98.89 OTHER SPECIFIED POSTPROCEDURAL STATES: Chronic | ICD-10-CM

## 2020-02-27 PROCEDURE — 74176 CT ABD & PELVIS W/O CONTRAST: CPT

## 2020-02-27 PROCEDURE — 71250 CT THORAX DX C-: CPT | Mod: 26

## 2020-02-27 PROCEDURE — 71250 CT THORAX DX C-: CPT

## 2020-02-27 PROCEDURE — 74176 CT ABD & PELVIS W/O CONTRAST: CPT | Mod: 26

## 2020-03-03 ENCOUNTER — APPOINTMENT (OUTPATIENT)
Dept: UROLOGY | Facility: CLINIC | Age: 80
End: 2020-03-03

## 2020-03-05 ENCOUNTER — APPOINTMENT (OUTPATIENT)
Dept: CARDIOLOGY | Facility: CLINIC | Age: 80
End: 2020-03-05
Payer: MEDICARE

## 2020-03-05 ENCOUNTER — NON-APPOINTMENT (OUTPATIENT)
Age: 80
End: 2020-03-05

## 2020-03-05 VITALS
SYSTOLIC BLOOD PRESSURE: 178 MMHG | HEART RATE: 67 BPM | WEIGHT: 139 LBS | OXYGEN SATURATION: 98 % | BODY MASS INDEX: 21.77 KG/M2 | DIASTOLIC BLOOD PRESSURE: 76 MMHG

## 2020-03-05 VITALS — SYSTOLIC BLOOD PRESSURE: 196 MMHG | DIASTOLIC BLOOD PRESSURE: 74 MMHG

## 2020-03-05 PROCEDURE — 93040 RHYTHM ECG WITH REPORT: CPT | Mod: 59

## 2020-03-05 PROCEDURE — 93000 ELECTROCARDIOGRAM COMPLETE: CPT

## 2020-03-05 PROCEDURE — 99214 OFFICE O/P EST MOD 30 MIN: CPT

## 2020-03-05 NOTE — DISCUSSION/SUMMARY
[FreeTextEntry1] : If blood pressure remains high, consider increasing Bystolic (which may possibly be limited by bradycardia) or initiating hydrochlorothiazide (note creatinines are high).\par Would not consider ACE/ARB/spironolactone in view of his history of hyperkalemia.\par

## 2020-03-05 NOTE — HISTORY OF PRESENT ILLNESS
[FreeTextEntry1] : He denies chest pain, shortness of breath, and palpitations.\par Blood pressures at home are 150s/70-75 in the morning, 130s (sometimes 120s)/60 in the evening.\par His blood pressure this morning was 154/70.

## 2020-03-11 ENCOUNTER — APPOINTMENT (OUTPATIENT)
Dept: CARDIOTHORACIC SURGERY | Facility: CLINIC | Age: 80
End: 2020-03-11
Payer: MEDICARE

## 2020-03-11 VITALS
BODY MASS INDEX: 21.77 KG/M2 | WEIGHT: 139 LBS | RESPIRATION RATE: 16 BRPM | HEART RATE: 70 BPM | OXYGEN SATURATION: 97 % | DIASTOLIC BLOOD PRESSURE: 68 MMHG | SYSTOLIC BLOOD PRESSURE: 156 MMHG

## 2020-03-11 PROCEDURE — 99213 OFFICE O/P EST LOW 20 MIN: CPT

## 2020-03-16 NOTE — ASSESSMENT
[FreeTextEntry1] : 80 year old male with a past medical history of hypertension, hyperlipidemia,  s/p TEVAR 8/20/15 presents for a follow up visit with repeat diagnostic imaging. \par \par CT chest, abdomen and pelvis without contrast 2/27/20: (creatinine 1.8)\par status post stent graft repair of the aortic arch and descending thoracic aorta. distal aortic arch measures 5.4cm. proximal descending thoracic aorta 5.3cm. mid descending thoracic aorta 4.3cm. 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 I have reviewed the indications for surgery, and used our webpage [email or web address] <[email or web address]> to illustrate the aorta and anatomy of the heart. I have discussed the indications for surgery with the patient. Those indications are the following: size greater than 5.0 cm, symptomatic aneurysms, family history of aortic dissection or aneurysm death with a size greater than 4.5 cm, other necessary cardiac procedures such as coronary artery bypass grafting or valvular disorders with an aneurysm greater than 4.5 cm, or connective tissue disorders with an aneurysm size greater than 4.5 cm. \par \par I had a lengthy discussion with the patient regarding his aneurysmal disease and progression. Although the aorta has not changed in size since the last CT scan, there is a 3% annual risk of aortic rupture/dissection at an aortic measurement of 5.5cm. I have recommended that the patient is a candidate for TEVAR.  I have also discussed the risks, benefits and alternatives to surgery. I have explained the risks of the surgery, including approximately 5% major mortality or morbidity including paralysis, stroke, infection, bleeding, death, renal failure and heart attack. \par \par Because of the risks vs. benefits, the patient has decided not to proceed with the surgery at this time. He will return in 6 months with repeat diagnostic imaging for routine surveillance. \par \par Plan\par \par 1. [facility] months with CTA chest, abdomen and pelvis. \par 2. Continue medication regimen.\par 3. Follow up with cardiologist and PCP.\par 4. Of note, patient's blood pressure was noted to be elevated at this visit - 156/68, HR 70. I have recommended the patient to monitor his blood pressure closely.  I have asked the patient to follow up with you for medication adjustment. Uncontrolled high blood pressure is the major risk factor for aortic dissection. \par

## 2020-03-16 NOTE — END OF VISIT
[FreeTextEntry3] : \par I, GUANACO COPELAND , am scribing for and in the presence of ANNABEL KIRBY the following sections: History of present illness, past Medical/family/surgical/family/social history, review of systems, vital signs, physical exam and disposition.\par \par I personally performed the services described in the documentation, reviewed the documentation recorded by the scribe in my presence and it accurately and completely records my words and actions.\par \par \par I personally performed the services described in the documentation, reviewed the documentation recorded by the scribe in my presence and it accurately and completely records my words and actions.\par \par \par \par

## 2020-03-16 NOTE — CONSULT LETTER
[Dear  ___] : Dear  [unfilled], [FreeTextEntry2] : Lang Mcconnell MD -   [place] [address]  [FreeTextEntry1] : \par I had the pleasure of seeing your patient, MUARLI MARIE, in my office today. \par \par We take a multidisciplinary team approach to patient care and consider you, the referring physician, an extension of our team. We will maintain an open line of communication with you throughout your patient's treatment course.  \par \par As you recall, he is a 80 year old male status post TEVAR on 8/20/15.The patient presents to the office today for a routine follow up visit with repeat diagnostic imaging. I have enclosed a copy for your records.\par \par The surgical repair is intact and stable. Therefore, I have recommended that the patient will follow up in the [facility] in six months with a CTA to monitor his surgical repair. My office will assist the patient with his upcoming appointment and I will update you on his  progress at that time.\par \par I have discussed with the patient that we will continue to monitor his aortic pathology closely at the [facility] for the [facility], that encompasses the entire health care system and is one of the largest in the nation at this point.\par \par I appreciate the opportunity to care for your patient at the [facility] for [facility] based at [facility]. If there are any questions or concerns, please call me directly at [phone]. \par \par Please see my note below. \par \par \par Sincerely, \par \par \par \par \par \par \par Marlo Corral M.D.\par Professor of Cardiovascular and Thoracic Surgery\par Minimally Invasive Valve Surgeon\par Director of Aortic Surgery, [facility]\par Cell: [phone]\par Email: [email or web address] \par \par [facility]:\par [address] [place] [address], [address] Floor, [place] [address]\par Office: [phone]\par Fax: [phone]\par \par [facility]:\par Department of Cardiovascular and Thoracic Surgery\par [address] [place] [address], [address]\par Office: [phone]\par Fax: [phone]\par \par Practice Manager: Ms. Lora Anton\par Email: [email or web address]\par Phone: [phone]

## 2020-03-16 NOTE — PHYSICAL EXAM
[Sclera] : the sclera and conjunctiva were normal [PERRL With Normal Accommodation] : pupils were equal in size, round, and reactive to light [Extraocular Movements] : extraocular movements were intact [Neck Appearance] : the appearance of the neck was normal [Neck Cervical Mass (___cm)] : no neck mass was observed [Jugular Venous Distention Increased] : there was no jugular-venous distention [Thyroid Diffuse Enlargement] : the thyroid was not enlarged [Thyroid Nodule] : there were no palpable thyroid nodules [] : no respiratory distress [Auscultation Breath Sounds / Voice Sounds] : lungs were clear to auscultation bilaterally [Heart Rate And Rhythm] : heart rate was normal and rhythm regular [Heart Sounds] : normal S1 and S2 [Heart Sounds Gallop] : no gallops [Murmurs] : no murmurs [Heart Sounds Pericardial Friction Rub] : no pericardial rub [Examination Of The Chest] : the chest was normal in appearance [Breast Appearance] : normal in appearance [Bowel Sounds] : normal bowel sounds [Abdomen Soft] : soft [Cervical Lymph Nodes Enlarged Posterior Bilaterally] : posterior cervical [Cervical Lymph Nodes Enlarged Anterior Bilaterally] : anterior cervical [No CVA Tenderness] : no ~M costovertebral angle tenderness [Involuntary Movements] : no involuntary movements were seen [Skin Color & Pigmentation] : normal skin color and pigmentation [Skin Turgor] : normal skin turgor [No Focal Deficits] : no focal deficits [Oriented To Time, Place, And Person] : oriented to person, place, and time [Impaired Insight] : insight and judgment were intact [Right Carotid Bruit] : no bruit heard over the right carotid [Left Carotid Bruit] : no bruit heard over the left carotid [FreeTextEntry1] : deferred

## 2020-03-16 NOTE — PROCEDURE
[FreeTextEntry1] : \par \par Dr. Corral discussed activity restrictions with the patient, and would advise exercise at a moderate amount with no heavy lifting over one third of body weight, and avoiding heart rates that exceed 140 beats per minute. In addition, every patient should abstain from tobacco abuse and to avoid all illicit drug use, especially stimulants such as cocaine or methamphetamine. Dr. Corral also counseled regarding maintaining a healthy heart diet, and losing any excessive weight as this also put undue stress on both the aorta and entire cardiovascular system. First degree family members should be screened for bicuspid valve disease, and ascending aortic aneurysms. \par \par

## 2020-04-07 ENCOUNTER — APPOINTMENT (OUTPATIENT)
Dept: CARDIOLOGY | Facility: CLINIC | Age: 80
End: 2020-04-07
Payer: MEDICARE

## 2020-04-07 PROCEDURE — 99213 OFFICE O/P EST LOW 20 MIN: CPT | Mod: 95

## 2020-04-07 NOTE — REASON FOR VISIT
[Hypertension] : hypertension [FreeTextEntry2] : TeleHealth video [FreeTextEntry1] : TeleHealth Video Encounter\par Initiated by: Patient election for TeleHealth visit\par Patient was consented for TeleHealth visit\par Patient Location: Home ([email or web address])\par Physician Location: Home

## 2020-04-07 NOTE — HISTORY OF PRESENT ILLNESS
[FreeTextEntry1] : Status of hypertension: He is now on a higher dose of amlodipine (10 mg daily.).  His blood pressures have improved significantly.  His recent blood pressures are: 121/65 (4 days ago), 127/60 (3 days ago), 125/55 (2 days ago), 138/67 (yesterday), and 135/58 (this morning). \par  \par Status of thoracic aneurysm: Distal aortic arch 5.4 cm on recent CT scan.  Saw Dr. Corral 3/11/2020, who recommended TEVAR. Patient elected to not proceed now, and will be following up with Dr. Corral after 6 months.\par \par Status of dyslipidemia: He is taking Livalo regularly, without myalgias.  He is obtaining blood work every 3 months from his PCP, Dr. Borrego.\par \par Status of mitral regurgitation: Denies dyspnea.\par \par ROS: He denies chest pain, shortness of breath, dizziness, and palpitations.\par

## 2020-06-29 ENCOUNTER — APPOINTMENT (OUTPATIENT)
Dept: CARDIOLOGY | Facility: CLINIC | Age: 80
End: 2020-06-29
Payer: MEDICARE

## 2020-06-29 ENCOUNTER — NON-APPOINTMENT (OUTPATIENT)
Age: 80
End: 2020-06-29

## 2020-06-29 VITALS — DIASTOLIC BLOOD PRESSURE: 70 MMHG | SYSTOLIC BLOOD PRESSURE: 184 MMHG

## 2020-06-29 VITALS
DIASTOLIC BLOOD PRESSURE: 60 MMHG | SYSTOLIC BLOOD PRESSURE: 170 MMHG | WEIGHT: 132 LBS | BODY MASS INDEX: 20.67 KG/M2 | HEART RATE: 80 BPM | OXYGEN SATURATION: 99 %

## 2020-06-29 PROCEDURE — 99214 OFFICE O/P EST MOD 30 MIN: CPT

## 2020-06-29 PROCEDURE — 93000 ELECTROCARDIOGRAM COMPLETE: CPT

## 2020-06-29 NOTE — REASON FOR VISIT
[Follow-Up - Clinic] : a clinic follow-up of [Dizziness] : dizziness [Hyperlipidemia] : hyperlipidemia [Hypertension] : hypertension

## 2020-07-03 ENCOUNTER — NON-APPOINTMENT (OUTPATIENT)
Age: 80
End: 2020-07-03

## 2020-07-16 ENCOUNTER — APPOINTMENT (OUTPATIENT)
Dept: UROLOGY | Facility: CLINIC | Age: 80
End: 2020-07-16
Payer: MEDICARE

## 2020-07-16 VITALS — TEMPERATURE: 98 F

## 2020-07-16 DIAGNOSIS — R31.21 ASYMPTOMATIC MICROSCOPIC HEMATURIA: ICD-10-CM

## 2020-07-16 DIAGNOSIS — R07.81 PLEURODYNIA: ICD-10-CM

## 2020-07-16 PROCEDURE — 99214 OFFICE O/P EST MOD 30 MIN: CPT

## 2020-07-17 LAB
APPEARANCE: CLEAR
BACTERIA: NEGATIVE
BILIRUBIN URINE: NEGATIVE
BLOOD URINE: NORMAL
COLOR: YELLOW
GLUCOSE QUALITATIVE U: NEGATIVE
HYALINE CASTS: 3 /LPF
KETONES URINE: NEGATIVE
LEUKOCYTE ESTERASE URINE: NEGATIVE
MICROSCOPIC-UA: NORMAL
NITRITE URINE: NEGATIVE
PH URINE: 6
PROTEIN URINE: ABNORMAL
RED BLOOD CELLS URINE: 8 /HPF
SPECIFIC GRAVITY URINE: 1.02
SQUAMOUS EPITHELIAL CELLS: 1 /HPF
URINE CYTOLOGY: NORMAL
UROBILINOGEN URINE: NORMAL
WHITE BLOOD CELLS URINE: 3 /HPF

## 2020-07-18 PROBLEM — R07.81 RIB PAIN ON LEFT SIDE: Status: ACTIVE | Noted: 2020-07-18

## 2020-07-18 NOTE — PHYSICAL EXAM
[General Appearance - Well Nourished] : well nourished [General Appearance - Well Developed] : well developed [Normal Appearance] : normal appearance [Well Groomed] : well groomed [General Appearance - In No Acute Distress] : no acute distress [Abdomen Soft] : soft [Abdomen Tenderness] : non-tender [Urinary Bladder Findings] : the bladder was normal on palpation [Costovertebral Angle Tenderness] : no ~M costovertebral angle tenderness [Edema] : no peripheral edema [Exaggerated Use Of Accessory Muscles For Inspiration] : no accessory muscle use [] : no respiratory distress [Respiration, Rhythm And Depth] : normal respiratory rhythm and effort [Oriented To Time, Place, And Person] : oriented to person, place, and time [Mood] : the mood was normal [Affect] : the affect was normal [Normal Station and Gait] : the gait and station were normal for the patient's age [Not Anxious] : not anxious [FreeTextEntry1] : tender musculoskeletal in origin over left lower ribs. [No Focal Deficits] : no focal deficits

## 2020-07-18 NOTE — HISTORY OF PRESENT ILLNESS
[FreeTextEntry1] : Pt referred for recent pain episode left flank similar to that requiring ER visit in 2019.  Apparently now with multiple CT scan all negative for stone, to evaluate recurrent flank pain.\par \par Pt is 79 yo male with h/o Htn, AAA thoracic aorta, renal cysts, and enlarged prostate (based on exam).  Experienced acute pain left side at approx rib level, which woke him out of sound sleep, required ER eval. Pain resolved quickly.  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  \par \par Never gross hematuria.  No prior h/o stone.\par Review of prior CT scan without evidence of stone recently updated 2/2020.  Bilateral many renal cysts, one hyperdense on right on new CT- no hydro, no sig difference between the two sides with respect to perinephric inflammation.\par \par REcent PE by Dr. Nix noted [Flank Pain] : flank pain

## 2020-07-18 NOTE — ASSESSMENT
[FreeTextEntry1] : Pt with recurrence left sided flank/lower rib pain---> no findings on exam today, but appears more likely musculoskeletal in origin as lower ribs.\par \par CT scan last year was negative for stone, hydro--- CT 2/2020 also showed no sig renal lesions or change. No stone.\par \par No further imaging at this time.  Have strongly recommended to patient to avoid future CT scan for flank pain, and have sought to teach him and wife the difference of musculoskeletal and renal pain\par \par u/a, cytology today for Asymptomatic hematuria f/u\par F/u with me approx 6 months for yearly exam with psa, full exam\par \par Addendum; cytology negative.

## 2020-07-27 ENCOUNTER — NON-APPOINTMENT (OUTPATIENT)
Age: 80
End: 2020-07-27

## 2020-07-27 ENCOUNTER — APPOINTMENT (OUTPATIENT)
Dept: CARDIOLOGY | Facility: CLINIC | Age: 80
End: 2020-07-27
Payer: MEDICARE

## 2020-07-27 VITALS
SYSTOLIC BLOOD PRESSURE: 156 MMHG | WEIGHT: 134 LBS | OXYGEN SATURATION: 98 % | BODY MASS INDEX: 20.99 KG/M2 | DIASTOLIC BLOOD PRESSURE: 64 MMHG | TEMPERATURE: 98.3 F | HEART RATE: 71 BPM

## 2020-07-27 VITALS — SYSTOLIC BLOOD PRESSURE: 152 MMHG | DIASTOLIC BLOOD PRESSURE: 64 MMHG

## 2020-07-27 VITALS — DIASTOLIC BLOOD PRESSURE: 54 MMHG | SYSTOLIC BLOOD PRESSURE: 142 MMHG

## 2020-07-27 DIAGNOSIS — R42 DIZZINESS AND GIDDINESS: ICD-10-CM

## 2020-07-27 PROCEDURE — 99214 OFFICE O/P EST MOD 30 MIN: CPT

## 2020-07-27 PROCEDURE — 93000 ELECTROCARDIOGRAM COMPLETE: CPT

## 2020-07-27 NOTE — HISTORY OF PRESENT ILLNESS
[FreeTextEntry1] : He is back on a lower dose of Bystolic (5 mg daily).\par His blood pressures at home are in the low 130s/80s.  Yesterday, it was 121/65.\par He denies chest pain, shortness of breath, palpitations, and dizziness.\par

## 2020-08-02 ENCOUNTER — NON-APPOINTMENT (OUTPATIENT)
Age: 80
End: 2020-08-02

## 2020-09-04 ENCOUNTER — APPOINTMENT (OUTPATIENT)
Dept: UROLOGY | Facility: CLINIC | Age: 80
End: 2020-09-04
Payer: MEDICARE

## 2020-09-04 VITALS — TEMPERATURE: 98.7 F

## 2020-09-04 DIAGNOSIS — R35.0 FREQUENCY OF MICTURITION: ICD-10-CM

## 2020-09-04 DIAGNOSIS — N52.9 MALE ERECTILE DYSFUNCTION, UNSPECIFIED: ICD-10-CM

## 2020-09-04 DIAGNOSIS — N28.1 CYST OF KIDNEY, ACQUIRED: ICD-10-CM

## 2020-09-04 PROCEDURE — 99214 OFFICE O/P EST MOD 30 MIN: CPT

## 2020-09-04 RX ORDER — NYSTATIN 100000 [USP'U]/G
100000 CREAM TOPICAL
Qty: 1 | Refills: 1 | Status: ACTIVE | COMMUNITY
Start: 2020-09-04 | End: 1900-01-01

## 2020-09-04 RX ORDER — TRIAMCINOLONE ACETONIDE 1 MG/G
0.1 CREAM TOPICAL TWICE DAILY
Qty: 1 | Refills: 0 | Status: ACTIVE | COMMUNITY
Start: 2020-09-04 | End: 1900-01-01

## 2020-09-04 NOTE — PHYSICAL EXAM
[General Appearance - Well Nourished] : well nourished [General Appearance - Well Developed] : well developed [Normal Appearance] : normal appearance [General Appearance - In No Acute Distress] : no acute distress [Well Groomed] : well groomed [Abdomen Soft] : soft [Costovertebral Angle Tenderness] : no ~M costovertebral angle tenderness [Abdomen Tenderness] : non-tender [Penis Abnormality] : normal uncircumcised penis [Exaggerated Use Of Accessory Muscles For Inspiration] : no accessory muscle use [Respiration, Rhythm And Depth] : normal respiratory rhythm and effort [] : no respiratory distress [Edema] : no peripheral edema [Oriented To Time, Place, And Person] : oriented to person, place, and time [Mood] : the mood was normal [Affect] : the affect was normal [Not Anxious] : not anxious [Normal Station and Gait] : the gait and station were normal for the patient's age [No Focal Deficits] : no focal deficits [FreeTextEntry1] : see  exam

## 2020-09-04 NOTE — ASSESSMENT
[FreeTextEntry1] : Possible balanitis- nystatin/triamcinolone bid under foreskin x 10 to 14 days\par \par Pt wishes to try viagra for ED. Had some improvement with cialis, but didn't like taking daily.\par \par RTC as scheduled unless no improvements\par

## 2020-09-04 NOTE — HISTORY OF PRESENT ILLNESS
[Currently Experiencing ___] :  [unfilled] [FreeTextEntry1] : Pt is 79 yo male with h/o Htn, AAA thoracic aorta, renal cysts, and enlarged prostate (based on exam). Experienced acute pain left side at approx rib level, which woke him out of sound sleep, required ER eval. Pain resolved quickly. 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 \par \par Seen in 7/16/20---> had pain which appeared to be musculoskeletal rather than renal.\par \par Episode of similar pain 2 weeks ago, resolved with some advil.  Voiding well, no dysuria, no hematuria- wanted to be seen for new 'swelling of the head of the penis'. Began perhaps a month ago.  Uncirc.  Feels tightness when he retracts the foreskin

## 2020-09-08 ENCOUNTER — RESULT REVIEW (OUTPATIENT)
Age: 80
End: 2020-09-08

## 2020-09-08 ENCOUNTER — OUTPATIENT (OUTPATIENT)
Dept: OUTPATIENT SERVICES | Facility: HOSPITAL | Age: 80
LOS: 1 days | End: 2020-09-08
Payer: MEDICARE

## 2020-09-08 ENCOUNTER — APPOINTMENT (OUTPATIENT)
Dept: CT IMAGING | Facility: IMAGING CENTER | Age: 80
End: 2020-09-08
Payer: MEDICARE

## 2020-09-08 DIAGNOSIS — I77.810 THORACIC AORTIC ECTASIA: ICD-10-CM

## 2020-09-08 DIAGNOSIS — Z98.89 OTHER SPECIFIED POSTPROCEDURAL STATES: Chronic | ICD-10-CM

## 2020-09-08 PROCEDURE — 74176 CT ABD & PELVIS W/O CONTRAST: CPT | Mod: 26

## 2020-09-08 PROCEDURE — 74176 CT ABD & PELVIS W/O CONTRAST: CPT

## 2020-09-08 PROCEDURE — 71250 CT THORAX DX C-: CPT | Mod: 26

## 2020-09-08 PROCEDURE — 71250 CT THORAX DX C-: CPT

## 2020-09-08 RX ORDER — SILDENAFIL 100 MG/1
100 TABLET, FILM COATED ORAL DAILY
Qty: 6 | Refills: 5 | Status: ACTIVE | COMMUNITY
Start: 2020-09-04 | End: 1900-01-01

## 2020-09-09 ENCOUNTER — APPOINTMENT (OUTPATIENT)
Dept: CARDIOTHORACIC SURGERY | Facility: CLINIC | Age: 80
End: 2020-09-09
Payer: MEDICARE

## 2020-09-09 VITALS
OXYGEN SATURATION: 98 % | SYSTOLIC BLOOD PRESSURE: 163 MMHG | DIASTOLIC BLOOD PRESSURE: 74 MMHG | HEART RATE: 68 BPM | RESPIRATION RATE: 16 BRPM

## 2020-09-09 PROCEDURE — 99214 OFFICE O/P EST MOD 30 MIN: CPT

## 2020-09-15 NOTE — CONSULT LETTER
[Dear  ___] : Dear  [unfilled], [FreeTextEntry2] : Ilia Grijalva MD [FreeTextEntry1] : I had the pleasure of seeing your patient, MURALI MARIE, in my office today. \par \par We take a multidisciplinary team approach to patient care and consider you, the referring physician, an extension of our team. We will maintain an open line of communication with you throughout your patient's treatment course.  \par \par As you recall, he is a 80 year old male status post TEVAR on 8/20/2015. The patient presents to the office today for a routine follow up visit with repeat diagnostic imaging. I have enclosed a copy for your records.\par \par The surgical repair is intact and stable. Therefore, I have recommended that the patient will follow up in the [facility] in 6 months with a CT chest, abdomen and pelvis w/o to monitor his surgical repair. My office will assist the patient with his upcoming appointment and I will update you on his  progress at that time.\par \par I have discussed with the patient that we will continue to monitor his aortic pathology closely at the [facility] for the [facility], that encompasses the entire health care system and is one of the largest in the nation at this point.\par \par I appreciate the opportunity to care for your patient at the [facility] for [facility] based at [facility]. If there are any questions or concerns, please call me directly at [phone]. \par \par Please see my note below. \par \par \par Sincerely, \par \par \par \par \par \par \par Marlo Corral M.D.\par Professor of Cardiovascular and Thoracic Surgery\par Minimally Invasive Valve Surgeon\par Director of Aortic Surgery, [facility]\par Cell: [phone]\par Email: [email or web address] \par \par [facility]:\par [address] [place] [address], [address] Floor, [place] [address]\par Office: [phone]\par Fax: [phone]\par \par [facility]:\par Department of Cardiovascular and Thoracic Surgery\par [address] [place] [address], [address]\par Office: [phone]\par Fax: [phone]\par \par Practice Manager: Ms. Lora Anton\par Email: [email or web address] Phone: [phone]

## 2020-09-15 NOTE — PROCEDURE
[FreeTextEntry1] : \par Dr. Corral discussed activity restrictions with the patient, and would advise exercise at a moderate amount with no heavy lifting over one third of body weight, and avoiding heart rates that exceed 140 beats per minute. Every patient must abstain from tobacco and illicit drug use, especially stimulants such as cocaine or methamphetamine. The patient was also counseled on maintaining a healthy heart diet, and losing any excessive weight as this also put undue stress on both the aorta and entire cardiovascular system. Patient was counseled on the importance of medication adherence for blood pressure control, as hypertension is a significant risk factor associated with aortic dissections. First degree family members should be screened for bicuspid valve disease, and ascending aortic aneurysms.\par \par Patient also was advised to view our educational video prior to this visit regarding aortic pathology, lifestyle modifications, risk factors and procedures, retrieved at [email or web address]/watch?[email or web address]=[email or web address].\par

## 2020-09-15 NOTE — ASSESSMENT
[FreeTextEntry1] : 80 year old male with a past medical history of hypertension, hyperlipidemia, renal cysts, BPH, s/p TEVAR 8/20/15 presents for a follow up visit for evaluation and management of distal aortic thoracic aortic aneurysm with repeat diagnostic imaging. \par \par \par The patient's medical records and diagnostic images were reviewed at the time of this office visit, and the following recommendation was made. The surgical repair is intact and stable. The distal thoracic aortic aneurysm remains unchanged. \par \par Plan:\par 1. Continue medication regimen\par 2. Follow up with PCP and cardiologist\par 3. BP control- I have recommended the patient to monitor his blood pressure closely. I have also advised the patient to take daily blood pressures at home and adhere to medication regimen.\par 4. Return to the [facility] in 6 months with CT chest, abdomen and pelvis w/o. \par

## 2020-09-15 NOTE — HISTORY OF PRESENT ILLNESS
[FreeTextEntry1] : 80 year old male with a past medical history of hypertension, hyperlipidemia, renal cysts, BPH, s/p TEVAR 8/20/15 presents for a follow up visit for evaluation and management of distal aortic thoracic aortic aneurysm with repeat diagnostic imaging. \par \par CT CAP non-contrast (as per PCP request; creatinine in 8/2019 1.8), 9/8/20: \par evidence of aneurysmal dilatation of the distal thoracic aorta at the diaphragmatic hiatus measuring 5.3x5.2cm unchanged compared with prior study. \par \par Patient is doing well and denies recent hospitalization, ER visits, or surgeries. He denies fever, chills, fatigue, headache, blurred vision, dizziness, syncope, chest pain, palpitations, shortness of breath, orthopnea, paroxysmal nocturnal dyspnea, nausea, vomiting, abdominal pain, back pain, BRBPR or swelling to legs.\par \par

## 2020-09-15 NOTE — DATA REVIEWED
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 \par \par CT chest, abdomen and pelvis without contrast 2/27/20: (creatinine 1.8)\par status post stent graft repair of the aortic arch and descending thoracic aorta. distal aortic arch measures 5.4cm. proximal descending thoracic aorta 5.3cm. mid descending thoracic aorta 4.3cm. evidence of aneurysmal dilatation of the distal thoracic aorta at the diaphragmatic hiatus measuring 5.3x5.2cm unchanged compared to prior study. \par

## 2020-09-15 NOTE — PHYSICAL EXAM
[Sclera] : the sclera and conjunctiva were normal [PERRL With Normal Accommodation] : pupils were equal in size, round, and reactive to light [Neck Cervical Mass (___cm)] : no neck mass was observed [Extraocular Movements] : extraocular movements were intact [Neck Appearance] : the appearance of the neck was normal [Jugular Venous Distention Increased] : there was no jugular-venous distention [Thyroid Nodule] : there were no palpable thyroid nodules [Thyroid Diffuse Enlargement] : the thyroid was not enlarged [] : no respiratory distress [Heart Rate And Rhythm] : heart rate was normal and rhythm regular [Auscultation Breath Sounds / Voice Sounds] : lungs were clear to auscultation bilaterally [Heart Sounds] : normal S1 and S2 [Murmurs] : no murmurs [Heart Sounds Gallop] : no gallops [Heart Sounds Pericardial Friction Rub] : no pericardial rub [Examination Of The Chest] : the chest was normal in appearance [No Abnormalities] : the abdominal aorta was not enlarged and no bruit was heard [2+] : right 2+ [Bowel Sounds] : normal bowel sounds [Abdomen Soft] : soft [Cervical Lymph Nodes Enlarged Anterior Bilaterally] : anterior cervical [Cervical Lymph Nodes Enlarged Posterior Bilaterally] : posterior cervical [Nail Clubbing] : no clubbing  or cyanosis of the fingernails [No CVA Tenderness] : no ~M costovertebral angle tenderness [Abnormal Walk] : normal gait [Involuntary Movements] : no involuntary movements were seen [Musculoskeletal - Swelling] : no joint swelling seen [Skin Color & Pigmentation] : normal skin color and pigmentation [Motor Tone] : muscle strength and tone were normal [Skin Turgor] : normal skin turgor [Oriented To Time, Place, And Person] : oriented to person, place, and time [Impaired Insight] : insight and judgment were intact [No Focal Deficits] : no focal deficits [FreeTextEntry1] : deferred

## 2020-10-22 ENCOUNTER — APPOINTMENT (OUTPATIENT)
Dept: UROLOGY | Facility: CLINIC | Age: 80
End: 2020-10-22
Payer: MEDICARE

## 2020-10-22 VITALS — TEMPERATURE: 98.3 F

## 2020-10-22 DIAGNOSIS — N52.9 MALE ERECTILE DYSFUNCTION, UNSPECIFIED: ICD-10-CM

## 2020-10-22 DIAGNOSIS — N48.1 BALANITIS: ICD-10-CM

## 2020-10-22 PROCEDURE — 99213 OFFICE O/P EST LOW 20 MIN: CPT

## 2020-10-22 NOTE — HISTORY OF PRESENT ILLNESS
[FreeTextEntry1] : Pt is 79 yo male with h/o Htn, AAA thoracic aorta, renal cysts, and enlarged prostate (based on exam). Experienced acute pain left side at approx rib level, which woke him out of sound sleep, required ER eval. Pain resolved quickly. 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 \par \par Seen in 7/16/20---> had pain which appeared to be musculoskeletal rather than renal.\par \par Voiding well, no dysuria, no hematuria- wanted to be seen in f/u of balanitis, ED starting viagra.\par \par Has been using the nystatin/triamcinolone daily since. Has had good response to viagra 100 mg-> sufficient for intercourse >90%, though had painful ejaculation with 2 episodes. O/w excellent response\par

## 2020-10-22 NOTE — ASSESSMENT
[FreeTextEntry1] : Stop nystatin/triamcinolone\par \par given relative improvement/success with viagra, continue at this time.\par \par All else appears to be going well; discussed at length\par \par RTC 6 months

## 2020-10-22 NOTE — PHYSICAL EXAM
[General Appearance - Well Developed] : well developed [General Appearance - Well Nourished] : well nourished [Normal Appearance] : normal appearance [Well Groomed] : well groomed [General Appearance - In No Acute Distress] : no acute distress [Penis Abnormality] : normal circumcised penis [FreeTextEntry1] : balanitis resolved [Oriented To Time, Place, And Person] : oriented to person, place, and time [Affect] : the affect was normal [Mood] : the mood was normal [Not Anxious] : not anxious [Normal Station and Gait] : the gait and station were normal for the patient's age [No Focal Deficits] : no focal deficits

## 2020-10-26 ENCOUNTER — APPOINTMENT (OUTPATIENT)
Dept: CARDIOLOGY | Facility: CLINIC | Age: 80
End: 2020-10-26
Payer: MEDICARE

## 2020-10-26 ENCOUNTER — NON-APPOINTMENT (OUTPATIENT)
Age: 80
End: 2020-10-26

## 2020-10-26 VITALS — WEIGHT: 140 LBS | HEART RATE: 81 BPM | OXYGEN SATURATION: 97 % | BODY MASS INDEX: 21.93 KG/M2 | TEMPERATURE: 98.2 F

## 2020-10-26 VITALS — SYSTOLIC BLOOD PRESSURE: 158 MMHG | DIASTOLIC BLOOD PRESSURE: 70 MMHG

## 2020-10-26 VITALS — DIASTOLIC BLOOD PRESSURE: 60 MMHG | SYSTOLIC BLOOD PRESSURE: 158 MMHG

## 2020-10-26 PROCEDURE — 99214 OFFICE O/P EST MOD 30 MIN: CPT

## 2020-10-26 PROCEDURE — 93000 ELECTROCARDIOGRAM COMPLETE: CPT

## 2020-10-26 NOTE — HISTORY OF PRESENT ILLNESS
[FreeTextEntry1] : His systolic blood pressures are in the 140s in the morning and 120s later in the day.\par He denies chest pain, shortness of breath, and palpitations.\par

## 2020-11-01 ENCOUNTER — NON-APPOINTMENT (OUTPATIENT)
Age: 80
End: 2020-11-01

## 2020-12-01 ENCOUNTER — APPOINTMENT (OUTPATIENT)
Dept: CARDIOLOGY | Facility: CLINIC | Age: 80
End: 2020-12-01
Payer: MEDICARE

## 2020-12-01 ENCOUNTER — NON-APPOINTMENT (OUTPATIENT)
Age: 80
End: 2020-12-01

## 2020-12-01 VITALS
WEIGHT: 135 LBS | BODY MASS INDEX: 21.14 KG/M2 | DIASTOLIC BLOOD PRESSURE: 70 MMHG | OXYGEN SATURATION: 99 % | HEART RATE: 69 BPM | TEMPERATURE: 98.4 F | SYSTOLIC BLOOD PRESSURE: 150 MMHG

## 2020-12-01 VITALS — DIASTOLIC BLOOD PRESSURE: 60 MMHG | SYSTOLIC BLOOD PRESSURE: 170 MMHG

## 2020-12-01 PROCEDURE — 36415 COLL VENOUS BLD VENIPUNCTURE: CPT

## 2020-12-01 PROCEDURE — 93000 ELECTROCARDIOGRAM COMPLETE: CPT

## 2020-12-01 PROCEDURE — 99214 OFFICE O/P EST MOD 30 MIN: CPT

## 2020-12-01 NOTE — HISTORY OF PRESENT ILLNESS
[FreeTextEntry1] : He is on a higher dose of Bystolic (10 mg daily).\par His systolic blood pressures are in the 150s in the morning and in the 130s (occasionally 120s) in the afternoon.  He states his pressures have improved since increasing Bystolic, citing the occasional readings in the 120s (which he had not had previously).  \par He denies chest pain, shortness of breath, and palpitations.\par \par

## 2020-12-07 ENCOUNTER — NON-APPOINTMENT (OUTPATIENT)
Age: 80
End: 2020-12-07

## 2020-12-09 ENCOUNTER — NON-APPOINTMENT (OUTPATIENT)
Age: 80
End: 2020-12-09

## 2020-12-20 LAB
ALBUMIN SERPL ELPH-MCNC: 3.6 G/DL
ALP BLD-CCNC: 85 U/L
ALT SERPL-CCNC: 9 U/L
ANION GAP SERPL CALC-SCNC: 13 MMOL/L
AST SERPL-CCNC: 14 U/L
BILIRUB SERPL-MCNC: 0.2 MG/DL
BUN SERPL-MCNC: 45 MG/DL
CALCIUM SERPL-MCNC: 8.8 MG/DL
CHLORIDE SERPL-SCNC: 112 MMOL/L
CHOLEST SERPL-MCNC: 209 MG/DL
CO2 SERPL-SCNC: 17 MMOL/L
CREAT SERPL-MCNC: 3.01 MG/DL
GLUCOSE SERPL-MCNC: 93 MG/DL
HDLC SERPL-MCNC: 53 MG/DL
LDLC SERPL CALC-MCNC: 124 MG/DL
LDLC SERPL DIRECT ASSAY-MCNC: 129 MG/DL
NONHDLC SERPL-MCNC: 155 MG/DL
POTASSIUM SERPL-SCNC: 5.6 MMOL/L
PROT SERPL-MCNC: 5.8 G/DL
SODIUM SERPL-SCNC: 143 MMOL/L
TRIGL SERPL-MCNC: 156 MG/DL

## 2021-01-05 ENCOUNTER — APPOINTMENT (OUTPATIENT)
Dept: CARDIOLOGY | Facility: CLINIC | Age: 81
End: 2021-01-05
Payer: MEDICARE

## 2021-01-05 ENCOUNTER — NON-APPOINTMENT (OUTPATIENT)
Age: 81
End: 2021-01-05

## 2021-01-05 ENCOUNTER — LABORATORY RESULT (OUTPATIENT)
Age: 81
End: 2021-01-05

## 2021-01-05 VITALS
TEMPERATURE: 98.3 F | WEIGHT: 137 LBS | HEART RATE: 70 BPM | BODY MASS INDEX: 21.46 KG/M2 | OXYGEN SATURATION: 99 % | SYSTOLIC BLOOD PRESSURE: 170 MMHG | DIASTOLIC BLOOD PRESSURE: 60 MMHG

## 2021-01-05 VITALS — DIASTOLIC BLOOD PRESSURE: 64 MMHG | SYSTOLIC BLOOD PRESSURE: 176 MMHG

## 2021-01-05 DIAGNOSIS — I34.0 NONRHEUMATIC MITRAL (VALVE) INSUFFICIENCY: ICD-10-CM

## 2021-01-05 DIAGNOSIS — R00.1 BRADYCARDIA, UNSPECIFIED: ICD-10-CM

## 2021-01-05 PROCEDURE — 99214 OFFICE O/P EST MOD 30 MIN: CPT

## 2021-01-05 PROCEDURE — 36415 COLL VENOUS BLD VENIPUNCTURE: CPT

## 2021-01-05 PROCEDURE — 93040 RHYTHM ECG WITH REPORT: CPT | Mod: 59

## 2021-01-05 PROCEDURE — 93306 TTE W/DOPPLER COMPLETE: CPT

## 2021-01-05 PROCEDURE — 93000 ELECTROCARDIOGRAM COMPLETE: CPT

## 2021-01-05 NOTE — HISTORY OF PRESENT ILLNESS
[FreeTextEntry1] : He is on a higher dose of Bystolic (15 mg daily).  \par His systolic blood pressures at home are in the 120s.  His systolic blood pressures was in the 160s with Dr. Borrego 3 weeks ago.\par He has a 1 month history of increased swelling in his feet.

## 2021-01-05 NOTE — REASON FOR VISIT
[Follow-Up - Clinic] : a clinic follow-up of [Abnormal Test Result] : an abnormal test result [Hypertension] : hypertension [FreeTextEntry1] : edema

## 2021-01-06 ENCOUNTER — OUTPATIENT (OUTPATIENT)
Dept: OUTPATIENT SERVICES | Facility: HOSPITAL | Age: 81
LOS: 1 days | End: 2021-01-06
Payer: MEDICARE

## 2021-01-06 ENCOUNTER — APPOINTMENT (OUTPATIENT)
Dept: ULTRASOUND IMAGING | Facility: CLINIC | Age: 81
End: 2021-01-06
Payer: MEDICARE

## 2021-01-06 ENCOUNTER — APPOINTMENT (OUTPATIENT)
Dept: CARDIOLOGY | Facility: CLINIC | Age: 81
End: 2021-01-06

## 2021-01-06 ENCOUNTER — NON-APPOINTMENT (OUTPATIENT)
Age: 81
End: 2021-01-06

## 2021-01-06 ENCOUNTER — RESULT REVIEW (OUTPATIENT)
Age: 81
End: 2021-01-06

## 2021-01-06 DIAGNOSIS — R60.0 LOCALIZED EDEMA: ICD-10-CM

## 2021-01-06 DIAGNOSIS — Z98.89 OTHER SPECIFIED POSTPROCEDURAL STATES: Chronic | ICD-10-CM

## 2021-01-06 LAB
ALBUMIN SERPL ELPH-MCNC: 3.9 G/DL
ALP BLD-CCNC: 81 U/L
ALT SERPL-CCNC: 10 U/L
ANION GAP SERPL CALC-SCNC: 13 MMOL/L
AST SERPL-CCNC: 14 U/L
BASOPHILS # BLD AUTO: 0.05 K/UL
BASOPHILS NFR BLD AUTO: 0.6 %
BILIRUB SERPL-MCNC: 0.2 MG/DL
BUN SERPL-MCNC: 47 MG/DL
CALCIUM SERPL-MCNC: 8.3 MG/DL
CHLORIDE SERPL-SCNC: 113 MMOL/L
CO2 SERPL-SCNC: 18 MMOL/L
CREAT SERPL-MCNC: 3.11 MG/DL
EOSINOPHIL # BLD AUTO: 0.14 K/UL
EOSINOPHIL NFR BLD AUTO: 1.8 %
GLUCOSE SERPL-MCNC: 88 MG/DL
HCT VFR BLD CALC: 32.5 %
HGB BLD-MCNC: 10.2 G/DL
IMM GRANULOCYTES NFR BLD AUTO: 0.5 %
LYMPHOCYTES # BLD AUTO: 1.08 K/UL
LYMPHOCYTES NFR BLD AUTO: 13.6 %
MAN DIFF?: NORMAL
MCHC RBC-ENTMCNC: 31.4 GM/DL
MCHC RBC-ENTMCNC: 31.9 PG
MCV RBC AUTO: 101.6 FL
MONOCYTES # BLD AUTO: 0.66 K/UL
MONOCYTES NFR BLD AUTO: 8.3 %
NEUTROPHILS # BLD AUTO: 5.98 K/UL
NEUTROPHILS NFR BLD AUTO: 75.2 %
NT-PROBNP SERPL-MCNC: 906 PG/ML
PLATELET # BLD AUTO: 151 K/UL
POTASSIUM SERPL-SCNC: 6.4 MMOL/L
PROT SERPL-MCNC: 5.8 G/DL
RBC # BLD: 3.2 M/UL
RBC # FLD: 13.1 %
SODIUM SERPL-SCNC: 144 MMOL/L
T3 SERPL-MCNC: 113 NG/DL
T3RU NFR SERPL: 1.1 TBI
T4 FREE SERPL-MCNC: 1.1 NG/DL
T4 SERPL-MCNC: 7.4 UG/DL
TSH SERPL-ACNC: 2.5 UIU/ML
WBC # FLD AUTO: 7.95 K/UL

## 2021-01-06 PROCEDURE — 93971 EXTREMITY STUDY: CPT | Mod: 26,RT

## 2021-01-06 PROCEDURE — 93971 EXTREMITY STUDY: CPT

## 2021-01-15 ENCOUNTER — RX RENEWAL (OUTPATIENT)
Age: 81
End: 2021-01-15

## 2021-01-15 RX ORDER — AMLODIPINE BESYLATE 10 MG/1
10 TABLET ORAL DAILY
Qty: 90 | Refills: 3 | Status: ACTIVE | COMMUNITY
Start: 2018-12-11 | End: 1900-01-01

## 2021-01-19 ENCOUNTER — APPOINTMENT (OUTPATIENT)
Dept: CARDIOLOGY | Facility: CLINIC | Age: 81
End: 2021-01-19
Payer: MEDICARE

## 2021-01-19 ENCOUNTER — NON-APPOINTMENT (OUTPATIENT)
Age: 81
End: 2021-01-19

## 2021-01-19 VITALS
OXYGEN SATURATION: 98 % | TEMPERATURE: 98 F | WEIGHT: 138 LBS | SYSTOLIC BLOOD PRESSURE: 166 MMHG | HEART RATE: 70 BPM | DIASTOLIC BLOOD PRESSURE: 62 MMHG | BODY MASS INDEX: 21.61 KG/M2

## 2021-01-19 VITALS — SYSTOLIC BLOOD PRESSURE: 164 MMHG | DIASTOLIC BLOOD PRESSURE: 83 MMHG

## 2021-01-19 VITALS — SYSTOLIC BLOOD PRESSURE: 172 MMHG | DIASTOLIC BLOOD PRESSURE: 68 MMHG

## 2021-01-19 PROCEDURE — 99214 OFFICE O/P EST MOD 30 MIN: CPT

## 2021-01-19 PROCEDURE — 93000 ELECTROCARDIOGRAM COMPLETE: CPT

## 2021-01-19 PROCEDURE — 36415 COLL VENOUS BLD VENIPUNCTURE: CPT

## 2021-01-31 ENCOUNTER — NON-APPOINTMENT (OUTPATIENT)
Age: 81
End: 2021-01-31

## 2021-02-11 ENCOUNTER — APPOINTMENT (OUTPATIENT)
Dept: CARDIOLOGY | Facility: CLINIC | Age: 81
End: 2021-02-11
Payer: MEDICARE

## 2021-02-11 ENCOUNTER — NON-APPOINTMENT (OUTPATIENT)
Age: 81
End: 2021-02-11

## 2021-02-11 VITALS
WEIGHT: 140 LBS | SYSTOLIC BLOOD PRESSURE: 146 MMHG | DIASTOLIC BLOOD PRESSURE: 80 MMHG | BODY MASS INDEX: 21.97 KG/M2 | RESPIRATION RATE: 17 BRPM | OXYGEN SATURATION: 99 % | HEART RATE: 66 BPM | HEIGHT: 67 IN | TEMPERATURE: 97.9 F

## 2021-02-11 VITALS — DIASTOLIC BLOOD PRESSURE: 70 MMHG | SYSTOLIC BLOOD PRESSURE: 174 MMHG

## 2021-02-11 DIAGNOSIS — N18.9 CHRONIC KIDNEY DISEASE, UNSPECIFIED: ICD-10-CM

## 2021-02-11 DIAGNOSIS — E87.5 HYPERKALEMIA: ICD-10-CM

## 2021-02-11 DIAGNOSIS — R60.0 LOCALIZED EDEMA: ICD-10-CM

## 2021-02-11 DIAGNOSIS — D64.9 ANEMIA, UNSPECIFIED: ICD-10-CM

## 2021-02-11 LAB
ALBUMIN SERPL ELPH-MCNC: 3.6 G/DL
ALP BLD-CCNC: 78 U/L
ALT SERPL-CCNC: 6 U/L
ANION GAP SERPL CALC-SCNC: 13 MMOL/L
AST SERPL-CCNC: 12 U/L
BILIRUB SERPL-MCNC: 0.2 MG/DL
BUN SERPL-MCNC: 51 MG/DL
CALCIUM SERPL-MCNC: 8.4 MG/DL
CHLORIDE SERPL-SCNC: 115 MMOL/L
CO2 SERPL-SCNC: 16 MMOL/L
CREAT SERPL-MCNC: 3.23 MG/DL
GLUCOSE SERPL-MCNC: 94 MG/DL
POTASSIUM SERPL-SCNC: 5.2 MMOL/L
PROT SERPL-MCNC: 5.6 G/DL
SODIUM SERPL-SCNC: 144 MMOL/L

## 2021-02-11 PROCEDURE — 99214 OFFICE O/P EST MOD 30 MIN: CPT

## 2021-02-11 PROCEDURE — 93000 ELECTROCARDIOGRAM COMPLETE: CPT

## 2021-02-11 PROCEDURE — 36415 COLL VENOUS BLD VENIPUNCTURE: CPT

## 2021-02-11 NOTE — HISTORY OF PRESENT ILLNESS
[FreeTextEntry1] : He is on an increased dose of Bystolic.\par His systolic blood pressures at home are in the 140s in the morning and 120s later in the day.\par He is having worsening edema, which he has attributed to Bystolic.  Because of the swelling, he has bilateral leg fatigue, which has limited his walking.  Support stockings and elevation have helped.

## 2021-02-11 NOTE — HISTORY OF PRESENT ILLNESS
[FreeTextEntry1] : Dr. Borrego repeated blood work 1/6/21.  He was seen by Dr. Jose David Dan on 1/6/21, who started him on Veltassa?? This was stopped after 1 day by Dr. Borrego when the 1/6/21 K came back as normal. A week later Dr. Dan advised him to restart it at twice a week.\par He is on a higher dose of Bystolic (he is supposed be taking 20 daily, but instead is been taking 10 twice daily).\par His systolic blood pressures are in the 150s in the morning and in the 120s in the evening.  \par He denies chest pain, shortness of breath, palpitations, and dizziness.

## 2021-02-16 ENCOUNTER — NON-APPOINTMENT (OUTPATIENT)
Age: 81
End: 2021-02-16

## 2021-02-16 LAB
ALBUMIN SERPL ELPH-MCNC: 3.7 G/DL
ALP BLD-CCNC: 85 U/L
ALT SERPL-CCNC: 9 U/L
ANION GAP SERPL CALC-SCNC: 13 MMOL/L
AST SERPL-CCNC: 14 U/L
BASOPHILS # BLD AUTO: 0.03 K/UL
BASOPHILS NFR BLD AUTO: 0.3 %
BILIRUB SERPL-MCNC: 0.2 MG/DL
BUN SERPL-MCNC: 54 MG/DL
CALCIUM SERPL-MCNC: 8.5 MG/DL
CHLORIDE SERPL-SCNC: 114 MMOL/L
CO2 SERPL-SCNC: 16 MMOL/L
CREAT SERPL-MCNC: 3.51 MG/DL
EOSINOPHIL # BLD AUTO: 0.36 K/UL
EOSINOPHIL NFR BLD AUTO: 4 %
GLUCOSE SERPL-MCNC: 99 MG/DL
HCT VFR BLD CALC: 32 %
HGB BLD-MCNC: 9.9 G/DL
IMM GRANULOCYTES NFR BLD AUTO: 0.6 %
LYMPHOCYTES # BLD AUTO: 1.54 K/UL
LYMPHOCYTES NFR BLD AUTO: 17.3 %
MAN DIFF?: NORMAL
MCHC RBC-ENTMCNC: 30.9 GM/DL
MCHC RBC-ENTMCNC: 31.1 PG
MCV RBC AUTO: 100.6 FL
MONOCYTES # BLD AUTO: 0.73 K/UL
MONOCYTES NFR BLD AUTO: 8.2 %
NEUTROPHILS # BLD AUTO: 6.21 K/UL
NEUTROPHILS NFR BLD AUTO: 69.6 %
PLATELET # BLD AUTO: 160 K/UL
POTASSIUM SERPL-SCNC: 5.9 MMOL/L
PROT SERPL-MCNC: 5.9 G/DL
RBC # BLD: 3.18 M/UL
RBC # FLD: 13.1 %
SODIUM SERPL-SCNC: 142 MMOL/L
WBC # FLD AUTO: 8.92 K/UL

## 2021-02-27 ENCOUNTER — NON-APPOINTMENT (OUTPATIENT)
Age: 81
End: 2021-02-27

## 2021-03-10 ENCOUNTER — APPOINTMENT (OUTPATIENT)
Dept: CARDIOTHORACIC SURGERY | Facility: CLINIC | Age: 81
End: 2021-03-10
Payer: MEDICARE

## 2021-03-10 DIAGNOSIS — E78.5 HYPERLIPIDEMIA, UNSPECIFIED: ICD-10-CM

## 2021-03-15 ENCOUNTER — OUTPATIENT (OUTPATIENT)
Dept: OUTPATIENT SERVICES | Facility: HOSPITAL | Age: 81
LOS: 1 days | End: 2021-03-15
Payer: MEDICARE

## 2021-03-15 ENCOUNTER — APPOINTMENT (OUTPATIENT)
Dept: CT IMAGING | Facility: CLINIC | Age: 81
End: 2021-03-15
Payer: MEDICARE

## 2021-03-15 DIAGNOSIS — Z98.890 OTHER SPECIFIED POSTPROCEDURAL STATES: ICD-10-CM

## 2021-03-15 DIAGNOSIS — Z98.89 OTHER SPECIFIED POSTPROCEDURAL STATES: Chronic | ICD-10-CM

## 2021-03-15 PROCEDURE — 74176 CT ABD & PELVIS W/O CONTRAST: CPT | Mod: 26,ME

## 2021-03-15 PROCEDURE — 71250 CT THORAX DX C-: CPT

## 2021-03-15 PROCEDURE — G1004: CPT

## 2021-03-15 PROCEDURE — 74176 CT ABD & PELVIS W/O CONTRAST: CPT

## 2021-03-15 PROCEDURE — 71250 CT THORAX DX C-: CPT | Mod: 26,ME

## 2021-03-16 ENCOUNTER — APPOINTMENT (OUTPATIENT)
Dept: CARDIOLOGY | Facility: CLINIC | Age: 81
End: 2021-03-16

## 2021-03-17 ENCOUNTER — APPOINTMENT (OUTPATIENT)
Dept: CARDIOTHORACIC SURGERY | Facility: CLINIC | Age: 81
End: 2021-03-17
Payer: MEDICARE

## 2021-03-17 DIAGNOSIS — I71.2 THORACIC AORTIC ANEURYSM, W/OUT RUPTURE: ICD-10-CM

## 2021-03-17 PROCEDURE — 99443: CPT | Mod: 95

## 2021-03-24 NOTE — END OF VISIT
[Time Spent: ___ minutes] : I have spent [unfilled] minutes of time on the encounter. [FreeTextEntry3] : \par I, GUANACO LOCKHARTU , am scribing for and in the presence of ANNABEL KIRBY the following sections: History of present illness, past Medical/family/surgical/family/social history, review of systems, and disposition.\par \par I personally performed the services described in the documentation, reviewed the documentation recorded by the scribe in my presence and it accurately and completely records my words and actions.\par

## 2021-03-24 NOTE — ASSESSMENT
[FreeTextEntry1] : 81 year old male s/p TEVAR 8/20/15 presents for a follow up visit for evaluation and management of distal aortic thoracic aortic aneurysm with repeat diagnostic imaging. \par \par The patient's medical records and diagnostic images were reviewed at the time of this office visit, and the following recommendation was made. The surgical repair is intact and stable. The distal thoracic aortic aneurysm remains unchanged in size. \par \par Plan:\par 1. Continue medication regimen\par 2. Follow up with PCP and cardiologist\par 3. BP control- I have recommended the patient to monitor his blood pressure closely. I have also advised the patient to take daily blood pressures at home and adhere to medication regimen.\par 4. [facility] to [facility] in one year with CT chest, abdomen and pelvis w/o. \par

## 2021-03-24 NOTE — PROCEDURE
[FreeTextEntry1] : Dr. Corral discussed activity restrictions with the patient, and would advise exercise at a moderate amount with no heavy lifting over one third of body weight, and avoiding heart rates that exceed 140 beats per minute. In addition, every patient should abstain from tobacco abuse and to avoid all illicit drug use, especially stimulants such as cocaine or methamphetamine. Dr. Corral also counseled regarding maintaining a healthy heart diet, and losing any excessive weight as this also put undue stress on both the aorta and entire cardiovascular system. First degree family members should be screened for bicuspid valve disease, and ascending aortic aneurysms. \par \par Patient was advised to view the educational video prior to this visit regarding aortic pathology, risk factors, surgical procedures, and lifestyle modifications. Video can be retrieved at [email or web address].\par

## 2021-03-24 NOTE — DATA REVIEWED
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 \par \par 2/27/20 CT chest, abdomen and pelvis without contrast : (creatinine 1.8)\par status post stent graft repair of the aortic arch and descending thoracic aorta. distal aortic arch measures 5.4cm. proximal descending thoracic aorta 5.3cm. mid descending thoracic aorta 4.3cm. evidence of aneurysmal dilatation of the distal thoracic aorta at the diaphragmatic hiatus measuring 5.3x5.2cm unchanged compared to prior study. \par \par 9/9/2020: CT chest, abdomen, pelvis \par Status post stent graft repair of the aortic arch and descending thoracic aorta. THe size of the thoracic aorta is unchanged. THe distal aortic arch measures 5.4 cm. Aneurysm of the distal thoracic aorta measuring 5.3 cm is unchanged. A 3 cm interdeterminate left renal lesion is unchanged. 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 This was reported as 3.3 cm in 2011. 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 Minimally changed compared to CT 6/23/20( 4.7 x 3.2 cm) . But had increased from earlier examination 7/23/18( 4.4 x 3.4 cm) .  \par \par 1/5/21 TTE revealed EF 60 to 65%, Mild to moderate mitral regurgitation. Mild to moderate aortic regurgitation. Sclerotic aortic valve with normal opening. Peak LVOT 4 mm hg. Mild tricuspid regurgitation. \par

## 2021-03-24 NOTE — HISTORY OF PRESENT ILLNESS
[FreeTextEntry1] : 81 year old male with a past medical history of hypertension, hyperlipidemia, CKD stage 4, renal cysts, BPH, s/p TEVAR 8/20/15 presents for a 6 month follow up visit for evaluation and management of distal thoracic aortic aneurysm with repeat diagnostic imaging. \par \par CT chest, abdomen and pelvis 3/15/21: \par Status post stent graft repair of thoracic aortic aneurysm with stable appearance. 5.3 cm aneurysmal dilatation distal to the graft involving the distal descending and proximal ascending aorta at the level of the diaphragmatic hiatus.\par \par Patient is doing well and denies recent hospitalization, ER visits, or surgeries. He denies fever, chills, fatigue, headache, blurred vision, dizziness, syncope, chest pain, palpitations, shortness of breath, orthopnea, paroxysmal nocturnal dyspnea, nausea, vomiting, abdominal pain, back pain, BRBPR or swelling to legs.\par \par

## 2021-04-08 ENCOUNTER — APPOINTMENT (OUTPATIENT)
Dept: UROLOGY | Facility: CLINIC | Age: 81
End: 2021-04-08

## 2021-04-22 NOTE — HISTORY OF PRESENT ILLNESS
[FreeTextEntry1] : 80 year old male with a past medical history of hypertension, hyperlipidemia,  s/p TEVAR 8/20/15 presents for a follow up visit with repeat diagnostic imaging. 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 \par \par CT chest, abdomen and pelvis without contrast 2/27/20: (creatinine 1.8)\par status post stent graft repair of the aortic arch and descending thoracic aorta. distal aortic arch measures 5.4cm. proximal descending thoracic aorta 5.3cm. mid descending thoracic aorta 4.3cm. evidence of aneurysmal dilatation of the distal thoracic aorta at the diaphragmatic hiatus measuring 5.3x5.2cm unchanged compared to prior study. \par \par Patient is doing well and denies recent hospitalization, ER visits, or surgeries. He  denies fever, chills, fatigue, headache, blurred vision, dizziness, syncope, chest pain, palpitations, shortness of breath, orthopnea, paroxysmal nocturnal dyspnea, nausea, vomiting, abdominal pain, back pain, BRBPR or swelling to legs.\par 
minimum assist (75% patients effort)

## 2021-04-27 ENCOUNTER — EMERGENCY (EMERGENCY)
Facility: HOSPITAL | Age: 81
LOS: 1 days | Discharge: HOME | End: 2021-04-27
Attending: EMERGENCY MEDICINE | Admitting: EMERGENCY MEDICINE
Payer: MEDICARE

## 2021-04-27 VITALS
TEMPERATURE: 98 F | RESPIRATION RATE: 16 BRPM | SYSTOLIC BLOOD PRESSURE: 135 MMHG | HEIGHT: 67 IN | DIASTOLIC BLOOD PRESSURE: 61 MMHG | HEART RATE: 84 BPM | OXYGEN SATURATION: 100 %

## 2021-04-27 DIAGNOSIS — Z98.89 OTHER SPECIFIED POSTPROCEDURAL STATES: Chronic | ICD-10-CM

## 2021-04-27 LAB
ALBUMIN SERPL ELPH-MCNC: 4.3 G/DL — SIGNIFICANT CHANGE UP (ref 3.3–5)
ALP SERPL-CCNC: 96 U/L — SIGNIFICANT CHANGE UP (ref 40–120)
ALT FLD-CCNC: 8 U/L — SIGNIFICANT CHANGE UP (ref 4–41)
ANION GAP SERPL CALC-SCNC: 15 MMOL/L — HIGH (ref 7–14)
AST SERPL-CCNC: 15 U/L — SIGNIFICANT CHANGE UP (ref 4–40)
BASOPHILS # BLD AUTO: 0.02 K/UL — SIGNIFICANT CHANGE UP (ref 0–0.2)
BASOPHILS NFR BLD AUTO: 0.1 % — SIGNIFICANT CHANGE UP (ref 0–2)
BILIRUB SERPL-MCNC: 0.4 MG/DL — SIGNIFICANT CHANGE UP (ref 0.2–1.2)
BLOOD GAS VENOUS COMPREHENSIVE RESULT: SIGNIFICANT CHANGE UP
BUN SERPL-MCNC: 68 MG/DL — HIGH (ref 7–23)
CALCIUM SERPL-MCNC: 9.4 MG/DL — SIGNIFICANT CHANGE UP (ref 8.4–10.5)
CHLORIDE SERPL-SCNC: 107 MMOL/L — SIGNIFICANT CHANGE UP (ref 98–107)
CO2 SERPL-SCNC: 18 MMOL/L — LOW (ref 22–31)
CREAT SERPL-MCNC: 4.15 MG/DL — HIGH (ref 0.5–1.3)
EOSINOPHIL # BLD AUTO: 0.01 K/UL — SIGNIFICANT CHANGE UP (ref 0–0.5)
EOSINOPHIL NFR BLD AUTO: 0.1 % — SIGNIFICANT CHANGE UP (ref 0–6)
GLUCOSE SERPL-MCNC: 117 MG/DL — HIGH (ref 70–99)
HCT VFR BLD CALC: 34.5 % — LOW (ref 39–50)
HGB BLD-MCNC: 11.3 G/DL — LOW (ref 13–17)
IANC: 12.08 K/UL — HIGH (ref 1.5–8.5)
IMM GRANULOCYTES NFR BLD AUTO: 0.3 % — SIGNIFICANT CHANGE UP (ref 0–1.5)
LYMPHOCYTES # BLD AUTO: 1.44 K/UL — SIGNIFICANT CHANGE UP (ref 1–3.3)
LYMPHOCYTES # BLD AUTO: 9.8 % — LOW (ref 13–44)
MCHC RBC-ENTMCNC: 31.2 PG — SIGNIFICANT CHANGE UP (ref 27–34)
MCHC RBC-ENTMCNC: 32.8 GM/DL — SIGNIFICANT CHANGE UP (ref 32–36)
MCV RBC AUTO: 95.3 FL — SIGNIFICANT CHANGE UP (ref 80–100)
MONOCYTES # BLD AUTO: 1.05 K/UL — HIGH (ref 0–0.9)
MONOCYTES NFR BLD AUTO: 7.2 % — SIGNIFICANT CHANGE UP (ref 2–14)
NEUTROPHILS # BLD AUTO: 12.08 K/UL — HIGH (ref 1.8–7.4)
NEUTROPHILS NFR BLD AUTO: 82.5 % — HIGH (ref 43–77)
NRBC # BLD: 0 /100 WBCS — SIGNIFICANT CHANGE UP
NRBC # FLD: 0 K/UL — SIGNIFICANT CHANGE UP
PLATELET # BLD AUTO: 212 K/UL — SIGNIFICANT CHANGE UP (ref 150–400)
POTASSIUM SERPL-MCNC: 4.9 MMOL/L — SIGNIFICANT CHANGE UP (ref 3.5–5.3)
POTASSIUM SERPL-SCNC: 4.9 MMOL/L — SIGNIFICANT CHANGE UP (ref 3.5–5.3)
PROT SERPL-MCNC: 7.2 G/DL — SIGNIFICANT CHANGE UP (ref 6–8.3)
RBC # BLD: 3.62 M/UL — LOW (ref 4.2–5.8)
RBC # FLD: 12.7 % — SIGNIFICANT CHANGE UP (ref 10.3–14.5)
SODIUM SERPL-SCNC: 140 MMOL/L — SIGNIFICANT CHANGE UP (ref 135–145)
WBC # BLD: 14.65 K/UL — HIGH (ref 3.8–10.5)
WBC # FLD AUTO: 14.65 K/UL — HIGH (ref 3.8–10.5)

## 2021-04-27 PROCEDURE — 71250 CT THORAX DX C-: CPT | Mod: 26

## 2021-04-27 PROCEDURE — 99284 EMERGENCY DEPT VISIT MOD MDM: CPT

## 2021-04-27 PROCEDURE — 74176 CT ABD & PELVIS W/O CONTRAST: CPT | Mod: 26

## 2021-04-27 RX ORDER — LIDOCAINE 4 G/100G
1 CREAM TOPICAL ONCE
Refills: 0 | Status: COMPLETED | OUTPATIENT
Start: 2021-04-27 | End: 2021-04-27

## 2021-04-27 RX ORDER — ACETAMINOPHEN 500 MG
650 TABLET ORAL ONCE
Refills: 0 | Status: COMPLETED | OUTPATIENT
Start: 2021-04-27 | End: 2021-04-27

## 2021-04-27 RX ORDER — SODIUM CHLORIDE 9 MG/ML
1000 INJECTION INTRAMUSCULAR; INTRAVENOUS; SUBCUTANEOUS ONCE
Refills: 0 | Status: COMPLETED | OUTPATIENT
Start: 2021-04-27 | End: 2021-04-27

## 2021-04-27 RX ADMIN — LIDOCAINE 1 PATCH: 4 CREAM TOPICAL at 19:36

## 2021-04-27 RX ADMIN — Medication 650 MILLIGRAM(S): at 19:36

## 2021-04-27 RX ADMIN — SODIUM CHLORIDE 1000 MILLILITER(S): 9 INJECTION INTRAMUSCULAR; INTRAVENOUS; SUBCUTANEOUS at 19:36

## 2021-04-27 NOTE — ED PROVIDER NOTE - NSFOLLOWUPINSTRUCTIONS_ED_ALL_ED_FT
Please return to the emergency department immediately should you feel worse in any way or have any of the following symptoms:    •	especially increased or different pain  •	 fevers  •	persistent vomiting  •	shaking chills    Please return to the emergency department for a recheck in 12 hours so we can re-evaluate you and ensure that you are not developing a problem that would require surgery or hospitalization.      Please follow up with the Doctor listed within the time frame specified. Thank you for coming to the emergency department. We hope you are feeling improved and continue to get better. Have a nice day. Urinalysis did reveal blood in Urine. Please Follow with PCP tomorrow    Please return to the emergency department immediately should you feel worse in any way or have any of the following symptoms:    •	especially increased or different pain  •	 fevers  •	persistent vomiting  •	shaking chills    Please return to the emergency department for a recheck in 12 hours so we can re-evaluate you and ensure that you are not developing a problem that would require surgery or hospitalization.      Please follow up with the Doctor listed within the time frame specified. Thank you for coming to the emergency department. We hope you are feeling improved and continue to get better. Have a nice day.

## 2021-04-27 NOTE — ED PROVIDER NOTE - CLINICAL SUMMARY MEDICAL DECISION MAKING FREE TEXT BOX
81yM w/pmhx CKD, HTN, HLD, hx of descending thoracic aortic aneurysm s/p repair 2015, being followed for infrarenal aortic aneurysm presenting with left sided flank pain x yesterday. Pt appears comfortable, took 2 Valium prior to ED arrival, pt pain is at a 1 at present. NV intact, no abd tenderness or CVA tenderness. In the setting of recently lifting bricks? Concern for MSK type pain, worsening of known aneurysm, renal colic? Plan: cbc/cmp, ua/ucx, CT abd/pelvis/chest, pt refusing analgesia at present

## 2021-04-27 NOTE — ED ADULT NURSE NOTE - OBJECTIVE STATEMENT
pt received to room 18, a&ox 4, ambulatory, pmh of HTN, HLD, thoracic aneurysm, p/w right flank pain x1day. Pt endorses that pain is similar to previous kidney stone pain. Pt denies N/V at this time however reports that he was mildly nauseous and vomited yesterday. Pt breathing even and unlabored on room air. Abdomen soft, nontender. Skin intact. NSR on cardiac monitor. Denies fever, chills, cough, SOB, chest pain, palpitations, dizziness, N/V/D, constipation, numbness, tingling. Right 18g IV placed. Labs collected and sent. pending urine collection and imaging.

## 2021-04-27 NOTE — ED PROVIDER NOTE - PATIENT PORTAL LINK FT
You can access the FollowMyHealth Patient Portal offered by [facility] by registering at the following website: [email or web address]. By joining [employer]’s FollowMyHealth portal, you will also be able to view your health information using other applications (apps) compatible with our system.

## 2021-04-27 NOTE — ED PROVIDER NOTE - OBJECTIVE STATEMENT
81yM w/pmhx CKD,   5.1cm infrarenal aortic aneurysm 81yM w/pmhx CKD, HTN, HLD, hx of descending thoracic aortic aneurysm s/p repair 2015, being followed for infrarenal aortic aneurysm presenting with left sided flank pain x yesterday. Pt reports yesterday he developed left flank pain with associated nausea and vomiting, woke in cold sweat. He took aleve yesterday which provided minimal relief and then took valium overnight and prior to ED arrival which significantly relieved the pain. Pt reports pain is a 1 at present. Pt denies fever, diarrhea, dysuria, urinary frequency, hematuria, hx of kidney stones, cp, sob, palpitations, near syncope, numbness/tingling, weakness, bowel or bladder incontinence or any other concerns. Of note, pt reports lifting bricks prior to symptom onset  Per pts PMD  he has a history of 5.1cm infrarenal aortic aneurysm, reports known history of CKD, states last imaging was a few ago to monitor aneurysm.

## 2021-04-27 NOTE — ED PROVIDER NOTE - PROGRESS NOTE DETAILS
PA Smartt: on reassessment patient reports feeling better. labs reviewed with no gross abnormal values. CT demonstrate stable thoracic and abdominal aortic aneurysm with stent repair aortic arch and proximal descending thoracic aorta with no interval changes. patient informed of findings. will follow up PCP tomorrow. Dwayne: Spoke with Dr. Bello. Hx of [facility], 5.1cm aneurysm, last imaging 1 week ago, stable, no surgeon will operate. Pt can followup with him tomorrow in the morning if pain controlled and imaging stable. Usually has non con CTs.

## 2021-04-27 NOTE — ED PROVIDER NOTE - PHYSICAL EXAMINATION
no CVA tenderness  sensation intact and equal b/l in all extremities, strength 5/5 in all extremities  no gait abnormality

## 2021-04-27 NOTE — ED ADULT NURSE NOTE - NS ED NOTE  TALK SOMEONE YN
Has patient eaten? If not, please schedule surgery for today after clinic hours. If she has, Friday AM preferred.    Etelvina Valero M.D.  Obstetrics and Gynecology    No

## 2021-05-05 ENCOUNTER — APPOINTMENT (OUTPATIENT)
Dept: UROLOGY | Facility: CLINIC | Age: 81
End: 2021-05-05

## 2022-01-16 NOTE — CONSULT NOTE ADULT - CONSULT REQUESTED DATE/TIME
Group Topic: BH Check-in/Symptom Rating    Date: 1/16/2022  Start Time: 0900  End Time: 0945  Facilitators: Elise Chaparro LCSW    Focus: Symptom Rating  Number in attendance: 4    Patients filled out a symptom rating scale and a goal sheet. Then they shared with the group focusing on what symptoms have improved, what symptoms they are still working on, any safety concerns with SI, HI, SH urges, etc, and, their goal for today. Patients offered feedback for each other as appropriate. Group ended with encouragement to continue attending groups.            Patient Evaluation: Not invited - due to medical condition   Pt excused from group due to positive COVID-19 status.     VERO Mccoy, SAC-IT         15-Aug-2019 19:11

## 2022-05-05 ENCOUNTER — OUTPATIENT (OUTPATIENT)
Dept: OUTPATIENT SERVICES | Facility: HOSPITAL | Age: 82
LOS: 1 days | End: 2022-05-05
Payer: MEDICARE

## 2022-05-05 ENCOUNTER — APPOINTMENT (OUTPATIENT)
Dept: CT IMAGING | Facility: IMAGING CENTER | Age: 82
End: 2022-05-05
Payer: MEDICARE

## 2022-05-05 DIAGNOSIS — Z98.89 OTHER SPECIFIED POSTPROCEDURAL STATES: Chronic | ICD-10-CM

## 2022-05-05 DIAGNOSIS — Z98.890 OTHER SPECIFIED POSTPROCEDURAL STATES: ICD-10-CM

## 2022-05-05 PROCEDURE — G1004: CPT

## 2022-05-05 PROCEDURE — 71250 CT THORAX DX C-: CPT | Mod: 26,ME

## 2022-05-05 PROCEDURE — 74176 CT ABD & PELVIS W/O CONTRAST: CPT | Mod: ME

## 2022-05-05 PROCEDURE — 74176 CT ABD & PELVIS W/O CONTRAST: CPT | Mod: 26,ME

## 2022-05-05 PROCEDURE — 71250 CT THORAX DX C-: CPT | Mod: ME

## 2022-05-11 ENCOUNTER — APPOINTMENT (OUTPATIENT)
Dept: CARDIOTHORACIC SURGERY | Facility: CLINIC | Age: 82
End: 2022-05-11
Payer: MEDICARE

## 2022-05-11 VITALS
WEIGHT: 103.4 LBS | DIASTOLIC BLOOD PRESSURE: 75 MMHG | BODY MASS INDEX: 16.23 KG/M2 | RESPIRATION RATE: 16 BRPM | TEMPERATURE: 98.2 F | SYSTOLIC BLOOD PRESSURE: 158 MMHG | HEART RATE: 88 BPM | HEIGHT: 67 IN | OXYGEN SATURATION: 95 %

## 2022-05-11 DIAGNOSIS — Z09 ENCOUNTER FOR FOLLOW-UP EXAMINATION AFTER COMPLETED TREATMENT FOR CONDITIONS OTHER THAN MALIGNANT NEOPLASM: ICD-10-CM

## 2022-05-11 PROCEDURE — 99215 OFFICE O/P EST HI 40 MIN: CPT

## 2022-05-12 RX ORDER — GLUCOSAMINE/CHONDR SU A SOD 750-600 MG
TABLET ORAL DAILY
Refills: 0 | Status: ACTIVE | COMMUNITY

## 2022-05-12 RX ORDER — MULTIVITAMIN
TABLET ORAL
Refills: 0 | Status: COMPLETED | COMMUNITY
End: 2022-05-12

## 2022-05-12 RX ORDER — ESOMEPRAZOLE MAGNESIUM 40 MG/1
40 CAPSULE, DELAYED RELEASE ORAL DAILY
Refills: 0 | Status: ACTIVE | COMMUNITY
Start: 2022-05-12

## 2022-05-12 RX ORDER — TAMSULOSIN HYDROCHLORIDE 0.4 MG/1
0.4 CAPSULE ORAL
Refills: 0 | Status: COMPLETED | COMMUNITY
End: 2022-05-12

## 2022-05-17 NOTE — DATA REVIEWED
[FreeTextEntry1] : 5/5/22 CT C/A/P w/o contrast revealed Interval enlargement of the distal aortic arch, which now measures 5.9 cm (previously 5.7 cm) and distal descending thoracic aorta which now measures 5.7 cm (previously 5.2 cm). Status post stent repair of the aortic arch and proximal descending thoracic. Please note that evaluation is somewhat limited without contrast.\par - Underdistended urinary bladder with mild bladder wall thickening. Correlate with urinalysis.\par \par CT chest, abdomen and pelvis 3/15/21: \par Status post stent graft repair of thoracic aortic aneurysm with stable appearance. 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 \par \par 2/27/20 CT chest, abdomen and pelvis without contrast : (creatinine 1.8)\par status post stent graft repair of the aortic arch and descending thoracic aorta. distal aortic arch measures 5.4cm. proximal descending thoracic aorta 5.3cm. mid descending thoracic aorta 4.3cm. evidence of aneurysmal dilatation of the distal thoracic aorta at the diaphragmatic hiatus measuring 5.3x5.2cm unchanged compared to prior study. \par \par 9/9/2020: CT chest, abdomen, pelvis \par Status post stent graft repair of the aortic arch and descending thoracic aorta. THe size of the thoracic aorta is unchanged. THe distal aortic arch measures 5.4 cm. Aneurysm of the distal thoracic aorta measuring 5.3 cm is unchanged. A 3 cm interdeterminate left renal lesion is unchanged. 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 This was reported as 3.3 cm in 2011. 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 Minimally changed compared to CT 6/23/20( 4.7 x 3.2 cm) . But had increased from earlier examination 7/23/18( 4.4 x 3.4 cm) .  \par \par 1/5/21 TTE revealed EF 60 to 65%, Mild to moderate mitral regurgitation. Mild to moderate aortic regurgitation. Sclerotic aortic valve with normal opening. Peak LVOT 4 mm hg. Mild tricuspid regurgitation. \par

## 2022-05-17 NOTE — PHYSICAL EXAM
[Sclera] : the sclera and conjunctiva were normal [PERRL With Normal Accommodation] : pupils were equal in size, round, and reactive to light [Neck Appearance] : the appearance of the neck was normal [] : no respiratory distress [Respiration, Rhythm And Depth] : normal respiratory rhythm and effort [Auscultation Breath Sounds / Voice Sounds] : lungs were clear to auscultation bilaterally [Apical Impulse] : the apical impulse was normal [Heart Rate And Rhythm] : heart rate was normal and rhythm regular [Heart Sounds] : normal S1 and S2 [Murmurs] : no murmurs [Examination Of The Chest] : the chest was normal in appearance [2+] : left 2+ [Breast Appearance] : normal in appearance [Bowel Sounds] : normal bowel sounds [Abdomen Soft] : soft [No CVA Tenderness] : no ~M costovertebral angle tenderness [Abnormal Walk] : normal gait [Involuntary Movements] : no involuntary movements were seen [Skin Color & Pigmentation] : normal skin color and pigmentation [Skin Turgor] : normal skin turgor [No Focal Deficits] : no focal deficits [Oriented To Time, Place, And Person] : oriented to person, place, and time [Impaired Insight] : insight and judgment were intact [Affect] : the affect was normal [Mood] : the mood was normal [Memory Recent] : recent memory was not impaired [Memory Remote] : remote memory was not impaired [FreeTextEntry1] : Deferred

## 2022-05-17 NOTE — ASSESSMENT
[FreeTextEntry1] : 81 year old male with a past medical history of hypertension, hyperlipidemia, CKD stage 4, renal cysts, BPH, s/p TEVAR 8/20/15 presents for a one year follow up visit for evaluation and management of distal thoracic aortic aneurysm with repeat diagnostic imaging. \par \par This visit, he reports that he was started on Hemo dialysis since Jan 2022 and he has a RT upper chest wall Permacath and LT AVF. He didn't start using AV Fistula yet. He also reports that he lost about 5 lbs since the dialysis and pedal edema and SOB got better .\par \par \par  reviewed the cardiac imaging, medical records and reports with patient and discussed the case. 5/5/22 CT C/A/P w/o contrast revealed Interval enlargement of the distal aortic arch, which now measures 5.9 cm (previously 5.7 cm) and distal descending thoracic aorta which now measures 5.7 cm (previously 5.2 cm). Status post stent repair of the aortic arch and proximal descending thoracic. Please note that evaluation is somewhat limited without contrast.\par - Underdistended urinary bladder with mild bladder wall thickening. Correlate with urinalysis.\par \par  CT chest, abdomen and pelvis 3/15/21: Status post stent graft repair of thoracic aortic aneurysm with stable appearance. 5.3 cm aneurysmal dilatation distal to the graft involving the distal descending and proximal ascending aorta at the level of the diaphragmatic hiatus.\par \par 1/5/21 TTE revealed EF 60 to 65%, Mild to moderate mitral regurgitation. Mild to moderate aortic regurgitation. Sclerotic aortic valve with normal opening. Peak LVOT 4 mm hg. Mild tricuspid regurgitation. \par \par  discussed the risks , benefits and alternatives to surgery. Risks included but not limited to  bleeding , stroke, Myocardial Infarction, kidney problems,Blood transfusion ,permanent  pacemaker implantation,  infections and death.  quoted a low operative mortality and complication risks.  also discussed the various approaches in detail. feel that the patient will benefit and is a candidate for a TEVAR . All questions and concerns were addressed and patient agrees to proceed with the plan. \par \par \par Plan:\par 1) TEVAR in [facility] \par 2) Lumbar drain placement \par 3) Will call him with results after reviewing the rephased CT scan \par 4) May return to clinic on PRN basis \par \par

## 2022-05-17 NOTE — CONSULT LETTER
[Dear  ___] : Dear  [unfilled], [FreeTextEntry2] : Ilia Grijalva MD [FreeTextEntry1] : \par \par I had the pleasure of seeing your patient, MURALI MARIE,in my office today. \par \par We take a multidisciplinary team approach to patient care and consider you, the primary physician, an extension of our team. We will maintain an open line of communication with you throughout your patient's treatment course. \par \par He  is being evaluated for thoracic aortic aneurysm. I have reviewed all of the patient's medical records and diagnostic images at the time of his  office consultation. I have enclosed a copy for your records. \par \par I have reviewed the indications for surgery,and used our webpage [email or web address] <[email or web address]> to illustrate the aorta and anatomy of the heart. The patient meets criteria for surgery. I have recommended that the patient is a candidate for a TEVAR . \par \par   I will update you on his perioperative status and disposition upon discharge. \par \par I appreciate the opportunity to care for your patient at the [facility] based at [facility]. If there are any questions or concerns, please call me directly at [phone]. \par \par \par \par Sincerely, \par \par \par \par Marlo Corral M.D.\par Professor of Cardiovascular and Thoracic Surgery\par Minimally Invasive Valve Surgeon\par Director of Aortic Surgery, [facility]\par Cell: [phone]\par Email: [email or web address] \par \par [facility]:\par [address], [address] Floor, [place] [address]\par Office: [phone]\par Fax: [phone]\par \par [facility]:\par Department of Cardiovascular and Thoracic Surgery\par [address] [place] [address], [address]\par Office: [phone]\par Fax: [phone]\par \par Practice Manager: Ms. Lora Anton\par Email: [email or web address]\par Phone: [phone]\par \par

## 2022-05-17 NOTE — PROCEDURE
[FreeTextEntry1] : Dr. Corral discussed activity restrictions with the patient, and would advise exercise at a moderate amount with no heavy lifting over one third of body weight, and avoiding heart rates that exceed 140 beats per minute. In addition, every patient should abstain from tobacco abuse and to avoid all illicit drug use, especially stimulants such as cocaine or methamphetamine. Dr. Corral also counseled regarding maintaining a healthy heart diet, and losing any excessive weight as this also put undue stress on both the aorta and entire cardiovascular system. First degree family members should be screened for bicuspid valve disease, and ascending aortic aneurysms. \par \par Patient was advised to view the educational video prior to this visit regarding aortic pathology, risk factors, surgical procedures, and lifestyle modifications. Video can be retrieved at [email or web address].\par

## 2022-05-17 NOTE — HISTORY OF PRESENT ILLNESS
[FreeTextEntry1] : 81 year old male with a past medical history of hypertension, hyperlipidemia, CKD stage 4, renal cysts, BPH, s/p TEVAR 8/20/15 presents for a one year follow up visit for evaluation and management of distal thoracic aortic aneurysm with repeat diagnostic imaging. \par \par This visit, he reports that he was started on Hemo dialysis since Jan 2022 and he has a RT upper chest wall Permacath and LT AVF. He didn't start using AV Fistula yet. He also reports that he lost about 5 lbs since the dialysis and pedal edema and SOB got better .\par \par Patient is doing well and denies recent hospitalization, ER visits, or surgeries. He denies fever, chills, fatigue, headache, blurred vision, dizziness, syncope, chest pain, palpitations, shortness of breath, orthopnea, paroxysmal nocturnal dyspnea, nausea, vomiting, abdominal pain, back pain, headache, visual disturbances, CVA, PE, DVT, D/C, hematochezia, melena, dysuria, hematuria,  BRBPR .\par \par \par \par 5/5/22 CT C/A/P w/o contrast revealed Interval enlargement of the distal aortic arch, which now measures 5.9 cm (previously 5.7 cm) and distal descending thoracic aorta which now measures 5.7 cm (previously 5.2 cm). Status post stent repair of the aortic arch and proximal descending thoracic. Please note that evaluation is somewhat limited without contrast.\par - Underdistended urinary bladder with mild bladder wall thickening. Correlate with urinalysis.\par \par \par \par \par \par \par

## 2022-05-18 ENCOUNTER — RX RENEWAL (OUTPATIENT)
Age: 82
End: 2022-05-18

## 2022-06-20 ENCOUNTER — OUTPATIENT (OUTPATIENT)
Dept: OUTPATIENT SERVICES | Facility: HOSPITAL | Age: 82
LOS: 1 days | End: 2022-06-20
Payer: MEDICARE

## 2022-06-20 ENCOUNTER — APPOINTMENT (OUTPATIENT)
Dept: CT IMAGING | Facility: IMAGING CENTER | Age: 82
End: 2022-06-20
Payer: MEDICARE

## 2022-06-20 DIAGNOSIS — Z95.828 PRESENCE OF OTHER VASCULAR IMPLANTS AND GRAFTS: ICD-10-CM

## 2022-06-20 DIAGNOSIS — Z98.890 OTHER SPECIFIED POSTPROCEDURAL STATES: ICD-10-CM

## 2022-06-20 DIAGNOSIS — Z98.89 OTHER SPECIFIED POSTPROCEDURAL STATES: Chronic | ICD-10-CM

## 2022-06-20 PROCEDURE — 71275 CT ANGIOGRAPHY CHEST: CPT

## 2022-06-20 PROCEDURE — 74174 CTA ABD&PLVS W/CONTRAST: CPT

## 2022-06-20 PROCEDURE — 71275 CT ANGIOGRAPHY CHEST: CPT | Mod: 26,MH

## 2022-06-20 PROCEDURE — 74174 CTA ABD&PLVS W/CONTRAST: CPT | Mod: 26,MH

## 2022-06-29 NOTE — END OF VISIT
Labor precautions.   Had one night with a lot of contractions, increased hydration.    
[FreeTextEntry3] : \par I, GUANACO LOCKHARTU , am scribing for and in the presence of ANNABEL KIRBY the following sections: History of present illness, past Medical/family/surgical/family/social history, review of systems, vital signs, physical exam and disposition.\par \par I personally performed the services described in the documentation, reviewed the documentation recorded by the scribe in my presence and it accurately and completely records my words and actions.\par \par \par \par

## 2022-07-11 ENCOUNTER — APPOINTMENT (OUTPATIENT)
Dept: RADIOLOGY | Facility: IMAGING CENTER | Age: 82
End: 2022-07-11

## 2022-07-11 ENCOUNTER — APPOINTMENT (OUTPATIENT)
Dept: CT IMAGING | Facility: IMAGING CENTER | Age: 82
End: 2022-07-11

## 2022-07-11 ENCOUNTER — OUTPATIENT (OUTPATIENT)
Dept: OUTPATIENT SERVICES | Facility: HOSPITAL | Age: 82
LOS: 1 days | End: 2022-07-11
Payer: MEDICARE

## 2022-07-11 DIAGNOSIS — Z98.890 OTHER SPECIFIED POSTPROCEDURAL STATES: ICD-10-CM

## 2022-07-11 DIAGNOSIS — Z98.89 OTHER SPECIFIED POSTPROCEDURAL STATES: Chronic | ICD-10-CM

## 2022-07-11 PROCEDURE — 71046 X-RAY EXAM CHEST 2 VIEWS: CPT | Mod: 26

## 2022-07-11 PROCEDURE — 70450 CT HEAD/BRAIN W/O DYE: CPT | Mod: 26,MH

## 2022-07-11 PROCEDURE — 72100 X-RAY EXAM L-S SPINE 2/3 VWS: CPT | Mod: 26

## 2022-07-11 PROCEDURE — 72100 X-RAY EXAM L-S SPINE 2/3 VWS: CPT

## 2022-07-11 PROCEDURE — 70450 CT HEAD/BRAIN W/O DYE: CPT

## 2022-07-11 PROCEDURE — 71046 X-RAY EXAM CHEST 2 VIEWS: CPT

## 2022-07-14 ENCOUNTER — NON-APPOINTMENT (OUTPATIENT)
Age: 82
End: 2022-07-14

## 2022-07-14 DIAGNOSIS — Z01.818 ENCOUNTER FOR OTHER PREPROCEDURAL EXAMINATION: ICD-10-CM

## 2022-07-26 ENCOUNTER — LABORATORY RESULT (OUTPATIENT)
Age: 82
End: 2022-07-26

## 2022-07-27 ENCOUNTER — TRANSCRIPTION ENCOUNTER (OUTPATIENT)
Age: 82
End: 2022-07-27

## 2022-07-28 ENCOUNTER — TRANSCRIPTION ENCOUNTER (OUTPATIENT)
Age: 82
End: 2022-07-28

## 2022-07-28 ENCOUNTER — INPATIENT (INPATIENT)
Facility: HOSPITAL | Age: 82
LOS: 2 days | Discharge: HOME | DRG: 219 | End: 2022-07-31
Attending: THORACIC SURGERY (CARDIOTHORACIC VASCULAR SURGERY) | Admitting: THORACIC SURGERY (CARDIOTHORACIC VASCULAR SURGERY)
Payer: MEDICARE

## 2022-07-28 VITALS
RESPIRATION RATE: 18 BRPM | OXYGEN SATURATION: 94 % | SYSTOLIC BLOOD PRESSURE: 107 MMHG | HEART RATE: 73 BPM | DIASTOLIC BLOOD PRESSURE: 58 MMHG

## 2022-07-28 DIAGNOSIS — Z98.89 OTHER SPECIFIED POSTPROCEDURAL STATES: Chronic | ICD-10-CM

## 2022-07-28 LAB
A1C WITH ESTIMATED AVERAGE GLUCOSE RESULT: 4.8 % — SIGNIFICANT CHANGE UP (ref 4–5.6)
ALBUMIN SERPL ELPH-MCNC: 4.1 G/DL — SIGNIFICANT CHANGE UP (ref 3.3–5)
ALP SERPL-CCNC: 79 U/L — SIGNIFICANT CHANGE UP (ref 40–120)
ALT FLD-CCNC: <5 U/L — LOW (ref 10–45)
ANION GAP SERPL CALC-SCNC: 13 MMOL/L — SIGNIFICANT CHANGE UP (ref 5–17)
APTT BLD: 28.2 SEC — SIGNIFICANT CHANGE UP (ref 27.5–35.5)
AST SERPL-CCNC: 12 U/L — SIGNIFICANT CHANGE UP (ref 10–40)
BASOPHILS # BLD AUTO: 0.05 K/UL — SIGNIFICANT CHANGE UP (ref 0–0.2)
BASOPHILS NFR BLD AUTO: 0.6 % — SIGNIFICANT CHANGE UP (ref 0–2)
BILIRUB SERPL-MCNC: 0.2 MG/DL — SIGNIFICANT CHANGE UP (ref 0.2–1.2)
BLD GP AB SCN SERPL QL: NEGATIVE — SIGNIFICANT CHANGE UP
BUN SERPL-MCNC: 34 MG/DL — HIGH (ref 7–23)
CALCIUM SERPL-MCNC: 9.6 MG/DL — SIGNIFICANT CHANGE UP (ref 8.4–10.5)
CHLORIDE SERPL-SCNC: 96 MMOL/L — SIGNIFICANT CHANGE UP (ref 96–108)
CHOLEST SERPL-MCNC: 140 MG/DL — SIGNIFICANT CHANGE UP
CO2 SERPL-SCNC: 30 MMOL/L — SIGNIFICANT CHANGE UP (ref 22–31)
CREAT SERPL-MCNC: 6.57 MG/DL — HIGH (ref 0.5–1.3)
EGFR: 8 ML/MIN/1.73M2 — LOW
EOSINOPHIL # BLD AUTO: 0.24 K/UL — SIGNIFICANT CHANGE UP (ref 0–0.5)
EOSINOPHIL NFR BLD AUTO: 2.8 % — SIGNIFICANT CHANGE UP (ref 0–6)
ESTIMATED AVERAGE GLUCOSE: 91 MG/DL — SIGNIFICANT CHANGE UP (ref 68–114)
GLUCOSE SERPL-MCNC: 86 MG/DL — SIGNIFICANT CHANGE UP (ref 70–99)
HBV SURFACE AB SER-ACNC: SIGNIFICANT CHANGE UP
HBV SURFACE AG SER-ACNC: SIGNIFICANT CHANGE UP
HCT VFR BLD CALC: 29.6 % — LOW (ref 39–50)
HCV AB S/CO SERPL IA: 0.04 S/CO — SIGNIFICANT CHANGE UP
HCV AB SERPL-IMP: SIGNIFICANT CHANGE UP
HDLC SERPL-MCNC: 41 MG/DL — SIGNIFICANT CHANGE UP
HGB BLD-MCNC: 9.7 G/DL — LOW (ref 13–17)
IMM GRANULOCYTES NFR BLD AUTO: 0.6 % — SIGNIFICANT CHANGE UP (ref 0–1.5)
INR BLD: 1.12 — SIGNIFICANT CHANGE UP (ref 0.88–1.16)
LIPID PNL WITH DIRECT LDL SERPL: 60 MG/DL — SIGNIFICANT CHANGE UP
LYMPHOCYTES # BLD AUTO: 2.14 K/UL — SIGNIFICANT CHANGE UP (ref 1–3.3)
LYMPHOCYTES # BLD AUTO: 24.7 % — SIGNIFICANT CHANGE UP (ref 13–44)
MAGNESIUM SERPL-MCNC: 2.1 MG/DL — SIGNIFICANT CHANGE UP (ref 1.6–2.6)
MCHC RBC-ENTMCNC: 32.7 PG — SIGNIFICANT CHANGE UP (ref 27–34)
MCHC RBC-ENTMCNC: 32.8 GM/DL — SIGNIFICANT CHANGE UP (ref 32–36)
MCV RBC AUTO: 99.7 FL — SIGNIFICANT CHANGE UP (ref 80–100)
MONOCYTES # BLD AUTO: 0.76 K/UL — SIGNIFICANT CHANGE UP (ref 0–0.9)
MONOCYTES NFR BLD AUTO: 8.8 % — SIGNIFICANT CHANGE UP (ref 2–14)
NEUTROPHILS # BLD AUTO: 5.42 K/UL — SIGNIFICANT CHANGE UP (ref 1.8–7.4)
NEUTROPHILS NFR BLD AUTO: 62.5 % — SIGNIFICANT CHANGE UP (ref 43–77)
NON HDL CHOLESTEROL: 99 MG/DL — SIGNIFICANT CHANGE UP
NRBC # BLD: 0 /100 WBCS — SIGNIFICANT CHANGE UP (ref 0–0)
NT-PROBNP SERPL-SCNC: 4107 PG/ML — HIGH (ref 0–300)
PHOSPHATE SERPL-MCNC: 4.7 MG/DL — HIGH (ref 2.5–4.5)
PLATELET # BLD AUTO: 173 K/UL — SIGNIFICANT CHANGE UP (ref 150–400)
POTASSIUM SERPL-MCNC: 4.2 MMOL/L — SIGNIFICANT CHANGE UP (ref 3.5–5.3)
POTASSIUM SERPL-SCNC: 4.2 MMOL/L — SIGNIFICANT CHANGE UP (ref 3.5–5.3)
PROT SERPL-MCNC: 6.6 G/DL — SIGNIFICANT CHANGE UP (ref 6–8.3)
PROTHROM AB SERPL-ACNC: 13.3 SEC — SIGNIFICANT CHANGE UP (ref 10.5–13.4)
RBC # BLD: 2.97 M/UL — LOW (ref 4.2–5.8)
RBC # FLD: 14.7 % — HIGH (ref 10.3–14.5)
RH IG SCN BLD-IMP: POSITIVE — SIGNIFICANT CHANGE UP
RH IG SCN BLD-IMP: POSITIVE — SIGNIFICANT CHANGE UP
SODIUM SERPL-SCNC: 139 MMOL/L — SIGNIFICANT CHANGE UP (ref 135–145)
TRIGL SERPL-MCNC: 194 MG/DL — HIGH
TSH SERPL-MCNC: 1.06 UIU/ML — SIGNIFICANT CHANGE UP (ref 0.27–4.2)
WBC # BLD: 8.66 K/UL — SIGNIFICANT CHANGE UP (ref 3.8–10.5)
WBC # FLD AUTO: 8.66 K/UL — SIGNIFICANT CHANGE UP (ref 3.8–10.5)

## 2022-07-28 PROCEDURE — 93880 EXTRACRANIAL BILAT STUDY: CPT | Mod: 26

## 2022-07-28 PROCEDURE — 62272 THER SPI PNXR DRG CSF: CPT

## 2022-07-28 PROCEDURE — 99292 CRITICAL CARE ADDL 30 MIN: CPT

## 2022-07-28 PROCEDURE — 36620 INSERTION CATHETER ARTERY: CPT

## 2022-07-28 PROCEDURE — 93010 ELECTROCARDIOGRAM REPORT: CPT

## 2022-07-28 PROCEDURE — 94010 BREATHING CAPACITY TEST: CPT | Mod: 26

## 2022-07-28 PROCEDURE — 99221 1ST HOSP IP/OBS SF/LOW 40: CPT | Mod: 25

## 2022-07-28 PROCEDURE — 99291 CRITICAL CARE FIRST HOUR: CPT

## 2022-07-28 RX ORDER — FENTANYL CITRATE 50 UG/ML
50 INJECTION INTRAVENOUS ONCE
Refills: 0 | Status: DISCONTINUED | OUTPATIENT
Start: 2022-07-28 | End: 2022-07-28

## 2022-07-28 RX ORDER — CEFAZOLIN SODIUM 1 G
2000 VIAL (EA) INJECTION ONCE
Refills: 0 | Status: COMPLETED | OUTPATIENT
Start: 2022-07-28 | End: 2022-07-28

## 2022-07-28 RX ORDER — POLYETHYLENE GLYCOL 3350 17 G/17G
17 POWDER, FOR SOLUTION ORAL DAILY
Refills: 0 | Status: DISCONTINUED | OUTPATIENT
Start: 2022-07-28 | End: 2022-07-29

## 2022-07-28 RX ORDER — CALCITRIOL 0.5 UG/1
1 CAPSULE ORAL
Qty: 0 | Refills: 0 | DISCHARGE

## 2022-07-28 RX ORDER — PITAVASTATIN CALCIUM 1.04 MG/1
1 TABLET, FILM COATED ORAL
Qty: 0 | Refills: 0 | DISCHARGE

## 2022-07-28 RX ORDER — MIDAZOLAM HYDROCHLORIDE 1 MG/ML
1 INJECTION, SOLUTION INTRAMUSCULAR; INTRAVENOUS ONCE
Refills: 0 | Status: DISCONTINUED | OUTPATIENT
Start: 2022-07-28 | End: 2022-07-28

## 2022-07-28 RX ORDER — METOPROLOL TARTRATE 50 MG
12.5 TABLET ORAL EVERY 12 HOURS
Refills: 0 | Status: DISCONTINUED | OUTPATIENT
Start: 2022-07-29 | End: 2022-07-29

## 2022-07-28 RX ORDER — MIDAZOLAM HYDROCHLORIDE 1 MG/ML
2 INJECTION, SOLUTION INTRAMUSCULAR; INTRAVENOUS ONCE
Refills: 0 | Status: DISCONTINUED | OUTPATIENT
Start: 2022-07-28 | End: 2022-07-28

## 2022-07-28 RX ORDER — PANTOPRAZOLE SODIUM 20 MG/1
40 TABLET, DELAYED RELEASE ORAL
Refills: 0 | Status: DISCONTINUED | OUTPATIENT
Start: 2022-07-28 | End: 2022-07-29

## 2022-07-28 RX ORDER — FENTANYL CITRATE 50 UG/ML
25 INJECTION INTRAVENOUS ONCE
Refills: 0 | Status: DISCONTINUED | OUTPATIENT
Start: 2022-07-28 | End: 2022-07-28

## 2022-07-28 RX ORDER — ACETAMINOPHEN 500 MG
650 TABLET ORAL ONCE
Refills: 0 | Status: COMPLETED | OUTPATIENT
Start: 2022-07-28 | End: 2022-07-28

## 2022-07-28 RX ORDER — AMLODIPINE BESYLATE 2.5 MG/1
1 TABLET ORAL
Qty: 0 | Refills: 0 | DISCHARGE

## 2022-07-28 RX ORDER — TAMSULOSIN HYDROCHLORIDE 0.4 MG/1
1 CAPSULE ORAL
Qty: 0 | Refills: 0 | DISCHARGE

## 2022-07-28 RX ORDER — LANOLIN ALCOHOL/MO/W.PET/CERES
5 CREAM (GRAM) TOPICAL AT BEDTIME
Refills: 0 | Status: DISCONTINUED | OUTPATIENT
Start: 2022-07-28 | End: 2022-07-29

## 2022-07-28 RX ORDER — ATORVASTATIN CALCIUM 80 MG/1
80 TABLET, FILM COATED ORAL AT BEDTIME
Refills: 0 | Status: DISCONTINUED | OUTPATIENT
Start: 2022-07-28 | End: 2022-07-29

## 2022-07-28 RX ORDER — LIDOCAINE HYDROCHLORIDE AND EPINEPHRINE 10; 10 MG/ML; UG/ML
10 INJECTION, SOLUTION INFILTRATION; PERINEURAL ONCE
Refills: 0 | Status: COMPLETED | OUTPATIENT
Start: 2022-07-28 | End: 2022-07-28

## 2022-07-28 RX ORDER — SODIUM ZIRCONIUM CYCLOSILICATE 10 G/10G
10 POWDER, FOR SUSPENSION ORAL
Qty: 0 | Refills: 0 | DISCHARGE

## 2022-07-28 RX ORDER — ESOMEPRAZOLE MAGNESIUM 40 MG/1
1 CAPSULE, DELAYED RELEASE ORAL
Qty: 0 | Refills: 0 | DISCHARGE

## 2022-07-28 RX ORDER — CHLORHEXIDINE GLUCONATE 213 G/1000ML
10 SOLUTION TOPICAL ONCE
Refills: 0 | Status: COMPLETED | OUTPATIENT
Start: 2022-07-28 | End: 2022-07-29

## 2022-07-28 RX ORDER — SODIUM CHLORIDE 9 MG/ML
3 INJECTION INTRAMUSCULAR; INTRAVENOUS; SUBCUTANEOUS EVERY 8 HOURS
Refills: 0 | Status: DISCONTINUED | OUTPATIENT
Start: 2022-07-28 | End: 2022-07-29

## 2022-07-28 RX ORDER — ACETAMINOPHEN 500 MG
650 TABLET ORAL EVERY 6 HOURS
Refills: 0 | Status: DISCONTINUED | OUTPATIENT
Start: 2022-07-28 | End: 2022-07-29

## 2022-07-28 RX ORDER — NEBIVOLOL HYDROCHLORIDE 5 MG/1
1 TABLET ORAL
Qty: 0 | Refills: 0 | DISCHARGE

## 2022-07-28 RX ORDER — CHLORHEXIDINE GLUCONATE 213 G/1000ML
1 SOLUTION TOPICAL ONCE
Refills: 0 | Status: COMPLETED | OUTPATIENT
Start: 2022-07-28 | End: 2022-07-28

## 2022-07-28 RX ORDER — CHLORHEXIDINE GLUCONATE 213 G/1000ML
1 SOLUTION TOPICAL ONCE
Refills: 0 | Status: COMPLETED | OUTPATIENT
Start: 2022-07-29 | End: 2022-07-29

## 2022-07-28 RX ORDER — ATORVASTATIN CALCIUM 80 MG/1
40 TABLET, FILM COATED ORAL AT BEDTIME
Refills: 0 | Status: DISCONTINUED | OUTPATIENT
Start: 2022-07-28 | End: 2022-07-28

## 2022-07-28 RX ADMIN — FENTANYL CITRATE 25 MICROGRAM(S): 50 INJECTION INTRAVENOUS at 20:29

## 2022-07-28 RX ADMIN — Medication 650 MILLIGRAM(S): at 23:20

## 2022-07-28 RX ADMIN — MIDAZOLAM HYDROCHLORIDE 2 MILLIGRAM(S): 1 INJECTION, SOLUTION INTRAMUSCULAR; INTRAVENOUS at 19:44

## 2022-07-28 RX ADMIN — ATORVASTATIN CALCIUM 80 MILLIGRAM(S): 80 TABLET, FILM COATED ORAL at 21:27

## 2022-07-28 RX ADMIN — CHLORHEXIDINE GLUCONATE 1 APPLICATION(S): 213 SOLUTION TOPICAL at 21:27

## 2022-07-28 RX ADMIN — Medication 100 MILLIGRAM(S): at 16:30

## 2022-07-28 RX ADMIN — SODIUM CHLORIDE 3 MILLILITER(S): 9 INJECTION INTRAMUSCULAR; INTRAVENOUS; SUBCUTANEOUS at 21:19

## 2022-07-28 RX ADMIN — FENTANYL CITRATE 50 MICROGRAM(S): 50 INJECTION INTRAVENOUS at 18:00

## 2022-07-28 RX ADMIN — LIDOCAINE HYDROCHLORIDE AND EPINEPHRINE 10 MILLILITER(S): 10; 10 INJECTION, SOLUTION INFILTRATION; PERINEURAL at 18:00

## 2022-07-28 RX ADMIN — FENTANYL CITRATE 50 MICROGRAM(S): 50 INJECTION INTRAVENOUS at 19:44

## 2022-07-28 RX ADMIN — Medication 5 MILLIGRAM(S): at 23:20

## 2022-07-28 RX ADMIN — MIDAZOLAM HYDROCHLORIDE 1 MILLIGRAM(S): 1 INJECTION, SOLUTION INTRAMUSCULAR; INTRAVENOUS at 20:29

## 2022-07-28 NOTE — H&P ADULT - ASSESSMENT
81 year old male with a past medical history of hypertension, hyperlipidemia, 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 Corral for evaluation and management of distal thoracic aortic aneurysm with repeat diagnostic imaging. 5/5/22 CT C/A/P w/o contrast revealed Interval enlargement of the distal aortic arch, which now measures 5.9 cm (previously 5.7 cm) and distal descending thoracic aorta which now measures 5.7 cm (previously 5.2 cm). Patient now presents to [facility] for presurgical workup for Lumbar Drain placement and TEVAR.     Plan:  Neurovascular:   - Pain well controlled. PRN's: None currently     Cardiovascular:   - Hx of TEVAR 2015, patient follows up with Dr. Corral regularly       - 5/5/22 CT C/A/P w/o contrast revealed Interval enlargement of the distal aortic arch, which now measures 5.9 cm (previously 5.7 cm) and distal descending thoracic aorta which now measures 5.7 cm (previously 5.2 cm)       - Patient admitted to [facility] for TEVAR with Dr. Corral 7/29/22       - Presurgical testing in progress       - Lumbar Drain with Neurosurgery pending  - Hx of HTN/HLD, continue Metoprolol (takes bystolic and amlodipine at home)  - Hemodynamically stable.   - Monitor: BP, HR, tele    Respiratory:   - Oxygenating well on room air  - Encourage continued use of IS 10x/hr and frequent ambulation    GI:  - GI PPX: protonix  - PO Diet  - Bowel Regimen: none currently      Renal / :  - Continue to monitor renal function: BUN/Cr 34/6.57  - Monitor I/O's daily   - Replace Electrolytes PRN    Endocrine:    - No hx of DM or thyroid dx  - A1c: Pending   - TSH: Pending    Hematologic:  - CBC: H/H- 9.7/29.6  - Coagulation Panel. PT/PTT/INR 13.3/28.2/1.12  - No AC due to Lumbar drain, reassess when to restart     ID:  - Temperature: afebrile  - CBC: WBC- 8.66  - Continue to observe for SIRS/Sepsis Syndrome.    Prophylaxis:  - No AC due to Lumbar drain, reassess when to restart    - Continue with SCD's b/l while patient is at rest     Disposition:  - Pending OR tomorrow

## 2022-07-28 NOTE — H&P ADULT - NSICDXPASTMEDICALHX_GEN_ALL_CORE_FT
PAST MEDICAL HISTORY:  CKD (chronic kidney disease)     H/O hyperlipidemia     H/O: HTN (hypertension)

## 2022-07-28 NOTE — PROCEDURE NOTE - ADDITIONAL PROCEDURE DETAILS
DESCRIPTION OF PROCEDURE:  The patient was placed in the left lateral position and the lumbar region was prepped and draped in the routine manner. The region was thoroughly infiltrated with lidocaine. A Tuohy needle was inserted into the L4-5 interspace. The needle was advanced until CSF egress was noted. The catheter was inserted through the Tuohy needle without difficulty. Outflow of CSF was obtained. The catheter was secured to the skin with the provided collarettes in the lumbar drain kit and was then taped to the back and flank. The lumbar drain was then connected to the external ventricular drainage chamber in a sterile fashion. The patient tolerated the procedure well and no complications were noted.

## 2022-07-28 NOTE — PATIENT PROFILE ADULT - NSPROPTRIGHTCAREGIVER_GEN_A_NUR
ROS: +CP/SOB. +cough. +fever. no n/v/d/c. +abd pain. +rash. no bleeding. no urinary complaints. +weakness. no vision changes. no HA. no neck/back pain. no extremity swelling/deformity. No change in mental status. no

## 2022-07-28 NOTE — H&P ADULT - HISTORY OF PRESENT ILLNESS
81 year old male with a past medical history of hypertension, hyperlipidemia, 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 Corral for evaluation and management of distal thoracic aortic aneurysm with repeat diagnostic imaging. 5/5/22 CT C/A/P w/o contrast revealed Interval enlargement of the distal aortic arch, which now measures 5.9 cm (previously 5.7 cm) and distal descending thoracic aorta which now measures 5.7 cm (previously 5.2 cm).        Patient is doing well and denies recent hospitalization, ER visits, or surgeries. He denies fever, chills, fatigue, headache, blurred vision, dizziness, syncope, chest pain, palpitations, shortness of breath, orthopnea, paroxysmal nocturnal dyspnea, nausea, vomiting, abdominal pain, back pain, headache, visual disturbances, CVA, PE, DVT, D/C, hematochezia, melena, dysuria, hematuria, BRBPR .         Status post stent repair of the aortic arch and proximal descending thoracic. Please note that evaluation is somewhat limited without contrast.  - Underdistended urinary bladder with mild bladder wall thickening. Correlate with urinalysis. 81 year old male with a past medical history of hypertension, hyperlipidemia, 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 Corral for evaluation and management of distal thoracic aortic aneurysm with repeat diagnostic imaging. 5/5/22 CT C/A/P w/o contrast revealed Interval enlargement of the distal aortic arch, which now measures 5.9 cm (previously 5.7 cm) and distal descending thoracic aorta which now measures 5.7 cm (previously 5.2 cm). Patient now presents to [facility] for presurgical workup for Lumbar Drain placement and TEVAR.     Patient seen at bedside today. Denies any acute complaints. Denies CP, SOB, Palpitations, RAYMOND, PND, N/V, Fever, Chills.

## 2022-07-28 NOTE — H&P ADULT - NSHPREVIEWOFSYSTEMS_GEN_ALL_CORE
CONSTITUTIONAL: Admits to recent weight loss which he attributes to HD, No fevers / chills, sweats, fatigue, weight gain  NEUROLOGICAL: No paresthesias, seizures, syncope, confusion  EYES: No blurry vision, discharge, pain, loss of vision  ENT: No difficulty hearing, vertigo, dysphagia, epistaxis, recent dental work  CV: No chest pain, palpitations, RAYMOND, orthopnea  RESPIRATORY:  No wheezing, SOB, cough / sputum, hemoptysis  GI: No nausea, vomiting, diarrhea, constipation, melena  : No hematuria, dysuria, urgency, incontinence  MUSCULOSKELETAL: No arthritis, joint swelling, muscle weakness  SKIN/BREAST: No rash, itching, hair loss, masses  PSYCHIATRY: No depression, anxiety, suicidal ideation  HEMATOLOGY:  No bruising easily, enlarged lymph nodes, tender lymph nodes  ENDOCRINE: No cold intolerance, heat intolerance, polydipsia

## 2022-07-28 NOTE — H&P ADULT - NSHPSOCIALHISTORY_GEN_ALL_CORE
Smoking: Former Smoking Smoking: Former Smoker 15 pack-year history, quit 50 years ago  Still continues to smoke marijuana daily   Alcohol: Denies  Recreational Drugs: Marijuana daily    Lives in House with Wife  Works daily doing carpentry, plumbing, electrical work

## 2022-07-28 NOTE — H&P ADULT - NSHPPHYSICALEXAM_GEN_ALL_CORE
Appearance: Normal	  HEENT:   Normal oral mucosa, PERRL, EOMI	  Neck: Supple, - JVD; Carotid Bruit   Cardiovascular: Normal S1 S2. No JVD. No murmurs.  Respiratory: Lungs clear to auscultation/Decreased Breath Sounds. No Rales, Rhonchi, Wheezing.	  Gastrointestinal:  Soft, non-tender, + BS.	  Skin: No rashes. No ecchymoses. No cyanosis.  Extremities: Normal range of motion, no clubbing, cyanosis or edema.  Vascular: Peripheral pulses palpable 2+ bilaterally, Left AVF for HD  Neurologic: Non-focal.  Psychiatry: A & O x 3, mood & affect appropriate.

## 2022-07-28 NOTE — PROGRESS NOTE ADULT - SUBJECTIVE AND OBJECTIVE BOX
CTICU  CRITICAL  CARE  attending     Hand off received 					   Pertinent clinical, laboratory, radiographic, hemodynamic, echocardiographic, respiratory data, microbiologic data and chart were reviewed and analyzed frequently throughout the course of the day and night  Patient seen and examined with CTS/ SH attending at bedside  Pt is a 82y , Male, HEALTH ISSUES - PROBLEM Dx:      , FAMILY HISTORY:  No pertinent family history in first degree relatives    PAST MEDICAL & SURGICAL HISTORY:  H/O: HTN (hypertension)      H/O hyperlipidemia      CKD (chronic kidney disease)      History of appendectomy        Patient is a 82y old  Male who presents with a chief complaint of Distal Thoracic Aortic Aneurysm (2022 18:30)      14 system review limited by mentation and multiorgan morbidity     Vital signs, hemodynamic and respiratory parameters were reviewed from the bedside nursing flowsheet.  ICU Vital Signs Last 24 Hrs  T(C): 36.7 (2022 19:45), Max: 36.7 (2022 17:12)  T(F): 98 (2022 19:45), Max: 98.1 (2022 17:12)  HR: 58 (2022 20:00) (58 - 73)  BP: 139/63 (2022 20:00) (107/58 - 139/63)  BP(mean): 91 (2022 20:00) (77 - 91)  ABP: --  ABP(mean): --  RR: 18 (2022 20:00) (17 - 18)  SpO2: 100% (2022 20:00) (92% - 100%)    O2 Parameters below as of 2022 21:00  Patient On (Oxygen Delivery Method): room air          Adult Advanced Hemodynamics Last 24 Hrs  CVP(mm Hg): --  CVP(cm H2O): --  CO: --  CI: --  PA: --  PA(mean): --  PCWP: --  SVR: --  SVRI: --  PVR: --  PVRI: --,     Intake and output was reviewed and the fluid balance was calculated  Daily     Daily Weight in k.3 (2022 19:45)  I&O's Summary    2022 07:01  -  2022 20:50  --------------------------------------------------------  IN: 50 mL / OUT: 10 mL / NET: 40 mL        All lines and drain sites were assessed  Glycemic trend was reviewedCAPILLARY BLOOD GLUCOSE        No acute change in focality  Auscultation of the chest reveals equal bs  Abdomen is soft  Extremities are warm and well perfused  Wounds appear clean and unremarkable  Antibiotics are periop    labs  CBC Full  -  ( 2022 16:00 )  WBC Count : 8.66 K/uL  RBC Count : 2.97 M/uL  Hemoglobin : 9.7 g/dL  Hematocrit : 29.6 %  Platelet Count - Automated : 173 K/uL  Mean Cell Volume : 99.7 fl  Mean Cell Hemoglobin : 32.7 pg  Mean Cell Hemoglobin Concentration : 32.8 gm/dL  Auto Neutrophil # : 5.42 K/uL  Auto Lymphocyte # : 2.14 K/uL  Auto Monocyte # : 0.76 K/uL  Auto Eosinophil # : 0.24 K/uL  Auto Basophil # : 0.05 K/uL  Auto Neutrophil % : 62.5 %  Auto Lymphocyte % : 24.7 %  Auto Monocyte % : 8.8 %  Auto Eosinophil % : 2.8 %  Auto Basophil % : 0.6 %        139  |  96  |  34<H>  ----------------------------<  86  4.2   |  30  |  6.57<H>    Ca    9.6      2022 16:00  Phos  4.7       Mg     2.1         TPro  6.6  /  Alb  4.1  /  TBili  0.2  /  DBili  x   /  AST  12  /  ALT  <5<L>  /  AlkPhos  79  28    PT/INR - ( 2022 16:00 )   PT: 13.3 sec;   INR: 1.12          PTT - ( 2022 16:00 )  PTT:28.2 sec  The current medications were reviewed   MEDICATIONS  (STANDING):  atorvastatin 80 milliGRAM(s) Oral at bedtime  chlorhexidine 0.12% Liquid 10 milliLiter(s) Swish and Spit once  chlorhexidine 4% Liquid 1 Application(s) Topical once  chlorhexidine 4% Liquid 1 Application(s) Topical once  pantoprazole    Tablet 40 milliGRAM(s) Oral before breakfast  polyethylene glycol 3350 17 Gram(s) Oral daily  sodium chloride 0.9% lock flush 3 milliLiter(s) IV Push every 8 hours    MEDICATIONS  (PRN):  acetaminophen     Tablet .. 650 milliGRAM(s) Oral every 6 hours PRN Mild Pain (1 - 3)       PROBLEM LIST/ ASSESSMENT:  HEALTH ISSUES - PROBLEM Dx:      ,   Patient is a 82y old  Male who presents with a chief complaint of Distal Thoracic Aortic Aneurysm (2022 18:30)    preo cardiac surgery                My plan includes :  close hemodynamic, ventilatory and drain monitoring and management per post op routine    Monitor for arrhythmias and monitor parameters for organ perfusion  beta blockade not administered due to hemodynamic instability and bradycardia  monitor neurologic status  Head of the bed should remain elevated to 45 deg .   chest PT and IS will be encouraged  monitor adequacy of oxygenation and ventilation and attempt to wean oxygen  antibiotic regimen will be tailored to the clinical, laboratory and microbiologic data  Nutritional goals will be met using po eventually , ensure adequate caloric intake and montior the same  Stress ulcer and VTE prophylaxis will be achieved    Glycemic control is satisfactory  Electrolytes have been repleted as necessary and wound care has been carried out. Pain control has been achieved.   agressive physical therapy and early mobility and ambulation goals will be met   The family was updated about the course and plan  CRITICAL CARE TIME personally provided by me  in evaluation and management, reassessments, review and interpretation of labs and x-rays, ventilator and hemodynamic management, formulating a plan and coordinating care: ___90____ MIN.  Time does not include procedural time. Time spent was non routine post-operarive caRE and included multiple and repeated evaluations at the bedside  CTICU ATTENDING     					    Zak Hernandez MD

## 2022-07-29 ENCOUNTER — APPOINTMENT (OUTPATIENT)
Dept: CARDIOTHORACIC SURGERY | Facility: HOSPITAL | Age: 82
End: 2022-07-29

## 2022-07-29 ENCOUNTER — TRANSCRIPTION ENCOUNTER (OUTPATIENT)
Age: 82
End: 2022-07-29

## 2022-07-29 LAB
ALBUMIN SERPL ELPH-MCNC: 3.5 G/DL — SIGNIFICANT CHANGE UP (ref 3.3–5)
ALBUMIN SERPL ELPH-MCNC: 3.7 G/DL — SIGNIFICANT CHANGE UP (ref 3.3–5)
ALBUMIN SERPL ELPH-MCNC: 3.7 G/DL — SIGNIFICANT CHANGE UP (ref 3.3–5)
ALP SERPL-CCNC: 79 U/L — SIGNIFICANT CHANGE UP (ref 40–120)
ALP SERPL-CCNC: 89 U/L — SIGNIFICANT CHANGE UP (ref 40–120)
ALP SERPL-CCNC: 90 U/L — SIGNIFICANT CHANGE UP (ref 40–120)
ALT FLD-CCNC: 5 U/L — LOW (ref 10–45)
ALT FLD-CCNC: <5 U/L — LOW (ref 10–45)
ALT FLD-CCNC: <5 U/L — LOW (ref 10–45)
ANION GAP SERPL CALC-SCNC: 12 MMOL/L — SIGNIFICANT CHANGE UP (ref 5–17)
ANION GAP SERPL CALC-SCNC: 14 MMOL/L — SIGNIFICANT CHANGE UP (ref 5–17)
ANION GAP SERPL CALC-SCNC: 17 MMOL/L — SIGNIFICANT CHANGE UP (ref 5–17)
APTT BLD: 28.3 SEC — SIGNIFICANT CHANGE UP (ref 27.5–35.5)
APTT BLD: 29.7 SEC — SIGNIFICANT CHANGE UP (ref 27.5–35.5)
APTT BLD: 31.8 SEC — SIGNIFICANT CHANGE UP (ref 27.5–35.5)
AST SERPL-CCNC: 10 U/L — SIGNIFICANT CHANGE UP (ref 10–40)
AST SERPL-CCNC: 11 U/L — SIGNIFICANT CHANGE UP (ref 10–40)
AST SERPL-CCNC: 13 U/L — SIGNIFICANT CHANGE UP (ref 10–40)
BASOPHILS # BLD AUTO: 0.03 K/UL — SIGNIFICANT CHANGE UP (ref 0–0.2)
BASOPHILS # BLD AUTO: 0.04 K/UL — SIGNIFICANT CHANGE UP (ref 0–0.2)
BASOPHILS NFR BLD AUTO: 0.3 % — SIGNIFICANT CHANGE UP (ref 0–2)
BASOPHILS NFR BLD AUTO: 0.3 % — SIGNIFICANT CHANGE UP (ref 0–2)
BILIRUB SERPL-MCNC: 0.2 MG/DL — SIGNIFICANT CHANGE UP (ref 0.2–1.2)
BILIRUB SERPL-MCNC: 0.3 MG/DL — SIGNIFICANT CHANGE UP (ref 0.2–1.2)
BILIRUB SERPL-MCNC: 0.4 MG/DL — SIGNIFICANT CHANGE UP (ref 0.2–1.2)
BUN SERPL-MCNC: 24 MG/DL — HIGH (ref 7–23)
BUN SERPL-MCNC: 25 MG/DL — HIGH (ref 7–23)
BUN SERPL-MCNC: 28 MG/DL — HIGH (ref 7–23)
CALCIUM SERPL-MCNC: 8.5 MG/DL — SIGNIFICANT CHANGE UP (ref 8.4–10.5)
CALCIUM SERPL-MCNC: 8.5 MG/DL — SIGNIFICANT CHANGE UP (ref 8.4–10.5)
CALCIUM SERPL-MCNC: 8.9 MG/DL — SIGNIFICANT CHANGE UP (ref 8.4–10.5)
CHLORIDE SERPL-SCNC: 97 MMOL/L — SIGNIFICANT CHANGE UP (ref 96–108)
CHLORIDE SERPL-SCNC: 98 MMOL/L — SIGNIFICANT CHANGE UP (ref 96–108)
CHLORIDE SERPL-SCNC: 99 MMOL/L — SIGNIFICANT CHANGE UP (ref 96–108)
CO2 SERPL-SCNC: 23 MMOL/L — SIGNIFICANT CHANGE UP (ref 22–31)
CO2 SERPL-SCNC: 24 MMOL/L — SIGNIFICANT CHANGE UP (ref 22–31)
CO2 SERPL-SCNC: 28 MMOL/L — SIGNIFICANT CHANGE UP (ref 22–31)
CREAT SERPL-MCNC: 5.23 MG/DL — HIGH (ref 0.5–1.3)
CREAT SERPL-MCNC: 5.52 MG/DL — HIGH (ref 0.5–1.3)
CREAT SERPL-MCNC: 6.13 MG/DL — HIGH (ref 0.5–1.3)
EGFR: 10 ML/MIN/1.73M2 — LOW
EGFR: 10 ML/MIN/1.73M2 — LOW
EGFR: 8 ML/MIN/1.73M2 — LOW
EOSINOPHIL # BLD AUTO: 0.03 K/UL — SIGNIFICANT CHANGE UP (ref 0–0.5)
EOSINOPHIL # BLD AUTO: 0.22 K/UL — SIGNIFICANT CHANGE UP (ref 0–0.5)
EOSINOPHIL NFR BLD AUTO: 0.2 % — SIGNIFICANT CHANGE UP (ref 0–6)
EOSINOPHIL NFR BLD AUTO: 2.3 % — SIGNIFICANT CHANGE UP (ref 0–6)
GAS PNL BLDA: SIGNIFICANT CHANGE UP
GLUCOSE SERPL-MCNC: 76 MG/DL — SIGNIFICANT CHANGE UP (ref 70–99)
GLUCOSE SERPL-MCNC: 80 MG/DL — SIGNIFICANT CHANGE UP (ref 70–99)
GLUCOSE SERPL-MCNC: 84 MG/DL — SIGNIFICANT CHANGE UP (ref 70–99)
HCT VFR BLD CALC: 29.6 % — LOW (ref 39–50)
HCT VFR BLD CALC: 31.1 % — LOW (ref 39–50)
HCT VFR BLD CALC: 32 % — LOW (ref 39–50)
HGB BLD-MCNC: 10.3 G/DL — LOW (ref 13–17)
HGB BLD-MCNC: 10.6 G/DL — LOW (ref 13–17)
HGB BLD-MCNC: 9.8 G/DL — LOW (ref 13–17)
IMM GRANULOCYTES NFR BLD AUTO: 0.5 % — SIGNIFICANT CHANGE UP (ref 0–1.5)
IMM GRANULOCYTES NFR BLD AUTO: 0.6 % — SIGNIFICANT CHANGE UP (ref 0–1.5)
INR BLD: 1.11 — SIGNIFICANT CHANGE UP (ref 0.88–1.16)
INR BLD: 1.12 — SIGNIFICANT CHANGE UP (ref 0.88–1.16)
INR BLD: 1.17 — HIGH (ref 0.88–1.16)
ISTAT ARTERIAL BE: 1 MMOL/L — SIGNIFICANT CHANGE UP (ref -2–3)
ISTAT ARTERIAL GLUCOSE: 79 MG/DL — SIGNIFICANT CHANGE UP (ref 70–99)
ISTAT ARTERIAL HCO3: 27 MMOL/L — HIGH (ref 22–26)
ISTAT ARTERIAL HEMATOCRIT: 31 % — LOW (ref 39–50)
ISTAT ARTERIAL HEMOGLOBIN: 10.5 G/DL — LOW (ref 13–17)
ISTAT ARTERIAL IONIZED CALCIUM: 1.11 MMOL/L — LOW (ref 1.12–1.3)
ISTAT ARTERIAL PCO2: 44 MMHG — SIGNIFICANT CHANGE UP (ref 35–45)
ISTAT ARTERIAL PH: 7.39 — SIGNIFICANT CHANGE UP (ref 7.35–7.45)
ISTAT ARTERIAL PO2: 112 MMHG — HIGH (ref 80–105)
ISTAT ARTERIAL POTASSIUM: 4.5 MMOL/L — SIGNIFICANT CHANGE UP (ref 3.5–5.3)
ISTAT ARTERIAL SO2: 98 % — SIGNIFICANT CHANGE UP (ref 95–98)
ISTAT ARTERIAL SODIUM: 137 MMOL/L — SIGNIFICANT CHANGE UP (ref 135–145)
ISTAT ARTERIAL TCO2: 28 MMOL/L — SIGNIFICANT CHANGE UP (ref 22–31)
LYMPHOCYTES # BLD AUTO: 0.99 K/UL — LOW (ref 1–3.3)
LYMPHOCYTES # BLD AUTO: 1.56 K/UL — SIGNIFICANT CHANGE UP (ref 1–3.3)
LYMPHOCYTES # BLD AUTO: 16.2 % — SIGNIFICANT CHANGE UP (ref 13–44)
LYMPHOCYTES # BLD AUTO: 7 % — LOW (ref 13–44)
MAGNESIUM SERPL-MCNC: 1.8 MG/DL — SIGNIFICANT CHANGE UP (ref 1.6–2.6)
MAGNESIUM SERPL-MCNC: 2 MG/DL — SIGNIFICANT CHANGE UP (ref 1.6–2.6)
MAGNESIUM SERPL-MCNC: 2.2 MG/DL — SIGNIFICANT CHANGE UP (ref 1.6–2.6)
MCHC RBC-ENTMCNC: 32.2 PG — SIGNIFICANT CHANGE UP (ref 27–34)
MCHC RBC-ENTMCNC: 32.5 PG — SIGNIFICANT CHANGE UP (ref 27–34)
MCHC RBC-ENTMCNC: 32.7 PG — SIGNIFICANT CHANGE UP (ref 27–34)
MCHC RBC-ENTMCNC: 33.1 GM/DL — SIGNIFICANT CHANGE UP (ref 32–36)
MCV RBC AUTO: 97.4 FL — SIGNIFICANT CHANGE UP (ref 80–100)
MCV RBC AUTO: 98.1 FL — SIGNIFICANT CHANGE UP (ref 80–100)
MCV RBC AUTO: 98.8 FL — SIGNIFICANT CHANGE UP (ref 80–100)
MONOCYTES # BLD AUTO: 0.72 K/UL — SIGNIFICANT CHANGE UP (ref 0–0.9)
MONOCYTES # BLD AUTO: 0.76 K/UL — SIGNIFICANT CHANGE UP (ref 0–0.9)
MONOCYTES NFR BLD AUTO: 5.3 % — SIGNIFICANT CHANGE UP (ref 2–14)
MONOCYTES NFR BLD AUTO: 7.5 % — SIGNIFICANT CHANGE UP (ref 2–14)
NEUTROPHILS # BLD AUTO: 12.32 K/UL — HIGH (ref 1.8–7.4)
NEUTROPHILS # BLD AUTO: 7.04 K/UL — SIGNIFICANT CHANGE UP (ref 1.8–7.4)
NEUTROPHILS NFR BLD AUTO: 73.1 % — SIGNIFICANT CHANGE UP (ref 43–77)
NEUTROPHILS NFR BLD AUTO: 86.7 % — HIGH (ref 43–77)
NRBC # BLD: 0 /100 WBCS — SIGNIFICANT CHANGE UP (ref 0–0)
PHOSPHATE SERPL-MCNC: 3.7 MG/DL — SIGNIFICANT CHANGE UP (ref 2.5–4.5)
PHOSPHATE SERPL-MCNC: 4.4 MG/DL — SIGNIFICANT CHANGE UP (ref 2.5–4.5)
PHOSPHATE SERPL-MCNC: 4.7 MG/DL — HIGH (ref 2.5–4.5)
PLATELET # BLD AUTO: 165 K/UL — SIGNIFICANT CHANGE UP (ref 150–400)
PLATELET # BLD AUTO: 168 K/UL — SIGNIFICANT CHANGE UP (ref 150–400)
PLATELET # BLD AUTO: 170 K/UL — SIGNIFICANT CHANGE UP (ref 150–400)
POTASSIUM SERPL-MCNC: 4 MMOL/L — SIGNIFICANT CHANGE UP (ref 3.5–5.3)
POTASSIUM SERPL-MCNC: 4.7 MMOL/L — SIGNIFICANT CHANGE UP (ref 3.5–5.3)
POTASSIUM SERPL-MCNC: 4.7 MMOL/L — SIGNIFICANT CHANGE UP (ref 3.5–5.3)
POTASSIUM SERPL-SCNC: 4 MMOL/L — SIGNIFICANT CHANGE UP (ref 3.5–5.3)
POTASSIUM SERPL-SCNC: 4.7 MMOL/L — SIGNIFICANT CHANGE UP (ref 3.5–5.3)
POTASSIUM SERPL-SCNC: 4.7 MMOL/L — SIGNIFICANT CHANGE UP (ref 3.5–5.3)
PROT SERPL-MCNC: 6.1 G/DL — SIGNIFICANT CHANGE UP (ref 6–8.3)
PROT SERPL-MCNC: 6.1 G/DL — SIGNIFICANT CHANGE UP (ref 6–8.3)
PROT SERPL-MCNC: 6.4 G/DL — SIGNIFICANT CHANGE UP (ref 6–8.3)
PROTHROM AB SERPL-ACNC: 13.2 SEC — SIGNIFICANT CHANGE UP (ref 10.5–13.4)
PROTHROM AB SERPL-ACNC: 13.3 SEC — SIGNIFICANT CHANGE UP (ref 10.5–13.4)
PROTHROM AB SERPL-ACNC: 13.9 SEC — HIGH (ref 10.5–13.4)
RBC # BLD: 3.04 M/UL — LOW (ref 4.2–5.8)
RBC # BLD: 3.17 M/UL — LOW (ref 4.2–5.8)
RBC # BLD: 3.24 M/UL — LOW (ref 4.2–5.8)
RBC # FLD: 14.5 % — SIGNIFICANT CHANGE UP (ref 10.3–14.5)
RBC # FLD: 14.6 % — HIGH (ref 10.3–14.5)
RBC # FLD: 14.6 % — HIGH (ref 10.3–14.5)
SODIUM SERPL-SCNC: 137 MMOL/L — SIGNIFICANT CHANGE UP (ref 135–145)
SODIUM SERPL-SCNC: 137 MMOL/L — SIGNIFICANT CHANGE UP (ref 135–145)
SODIUM SERPL-SCNC: 138 MMOL/L — SIGNIFICANT CHANGE UP (ref 135–145)
WBC # BLD: 11.88 K/UL — HIGH (ref 3.8–10.5)
WBC # BLD: 14.21 K/UL — HIGH (ref 3.8–10.5)
WBC # BLD: 9.63 K/UL — SIGNIFICANT CHANGE UP (ref 3.8–10.5)
WBC # FLD AUTO: 11.88 K/UL — HIGH (ref 3.8–10.5)
WBC # FLD AUTO: 14.21 K/UL — HIGH (ref 3.8–10.5)
WBC # FLD AUTO: 9.63 K/UL — SIGNIFICANT CHANGE UP (ref 3.8–10.5)

## 2022-07-29 PROCEDURE — 34713 PERQ ACCESS & CLSR FEM ART: CPT | Mod: GC,62

## 2022-07-29 PROCEDURE — 71045 X-RAY EXAM CHEST 1 VIEW: CPT | Mod: 26

## 2022-07-29 PROCEDURE — 33881 EVASC RPR TA NDGFT XCOV LSA: CPT | Mod: GC,62

## 2022-07-29 PROCEDURE — 99232 SBSQ HOSP IP/OBS MODERATE 35: CPT

## 2022-07-29 PROCEDURE — 75957: CPT | Mod: 26

## 2022-07-29 PROCEDURE — 37252 INTRVASC US NONCORONARY 1ST: CPT | Mod: 62

## 2022-07-29 PROCEDURE — 33881 EVASC RPR TA NDGFT XCOV LSA: CPT | Mod: 62

## 2022-07-29 PROCEDURE — 34713 PERQ ACCESS & CLSR FEM ART: CPT | Mod: RT,62

## 2022-07-29 PROCEDURE — 99291 CRITICAL CARE FIRST HOUR: CPT

## 2022-07-29 PROCEDURE — 99292 CRITICAL CARE ADDL 30 MIN: CPT

## 2022-07-29 PROCEDURE — 75957: CPT | Mod: 26,80,GC

## 2022-07-29 PROCEDURE — 36200 PLACE CATHETER IN AORTA: CPT | Mod: 50,GC

## 2022-07-29 DEVICE — IMPLANTABLE DEVICE: Type: IMPLANTABLE DEVICE | Status: FUNCTIONAL

## 2022-07-29 DEVICE — KIT CVC 3LUM SPECTRUM 9FR: Type: IMPLANTABLE DEVICE | Status: FUNCTIONAL

## 2022-07-29 DEVICE — CATH SOFTVU BERENSTN 5FRX100: Type: IMPLANTABLE DEVICE | Status: FUNCTIONAL

## 2022-07-29 DEVICE — SHEATH INTRODUCER TERUMO PINNACLE CORONARY 5FR X 10CM X 0.038" MINI WIRE: Type: IMPLANTABLE DEVICE | Status: FUNCTIONAL

## 2022-07-29 DEVICE — SHEATH INTRODUCER TERUMO PINNACLE CORONARY 8FR X 10CM X 0.038" MINI WIRE: Type: IMPLANTABLE DEVICE | Status: FUNCTIONAL

## 2022-07-29 DEVICE — CATH IVUS T/A PU .035CM: Type: IMPLANTABLE DEVICE | Status: FUNCTIONAL

## 2022-07-29 DEVICE — CATH AUROUS PIGTAIL 35CM 5FR: Type: IMPLANTABLE DEVICE | Status: FUNCTIONAL

## 2022-07-29 DEVICE — GUIDEWIRE LUNDERQUIST EXTRA-STIFF DOUBLE CURVED .035" X 260CM: Type: IMPLANTABLE DEVICE | Status: FUNCTIONAL

## 2022-07-29 DEVICE — INTRO MICROPUNC 4FRX10CM SS: Type: IMPLANTABLE DEVICE | Status: FUNCTIONAL

## 2022-07-29 DEVICE — SHEATH INTRODUCER TERUMO PINNACLE CORONARY 9FR X 10CM X 0.038" MINI WIRE: Type: IMPLANTABLE DEVICE | Status: FUNCTIONAL

## 2022-07-29 DEVICE — SUT PERCLOSE PROGLIDE 6FR: Type: IMPLANTABLE DEVICE | Status: FUNCTIONAL

## 2022-07-29 DEVICE — SHEATH INTRODUCER TERUMO PINNACLE CORONARY 6FR X 10CM X 0.038" MINI WIRE: Type: IMPLANTABLE DEVICE | Status: FUNCTIONAL

## 2022-07-29 RX ORDER — TAMSULOSIN HYDROCHLORIDE 0.4 MG/1
0.4 CAPSULE ORAL AT BEDTIME
Refills: 0 | Status: DISCONTINUED | OUTPATIENT
Start: 2022-07-29 | End: 2022-07-29

## 2022-07-29 RX ORDER — LANOLIN ALCOHOL/MO/W.PET/CERES
5 CREAM (GRAM) TOPICAL AT BEDTIME
Refills: 0 | Status: DISCONTINUED | OUTPATIENT
Start: 2022-07-29 | End: 2022-07-31

## 2022-07-29 RX ORDER — ACETAMINOPHEN 500 MG
1000 TABLET ORAL ONCE
Refills: 0 | Status: COMPLETED | OUTPATIENT
Start: 2022-07-29 | End: 2022-07-29

## 2022-07-29 RX ORDER — ACETAMINOPHEN 500 MG
650 TABLET ORAL EVERY 6 HOURS
Refills: 0 | Status: DISCONTINUED | OUTPATIENT
Start: 2022-07-29 | End: 2022-07-31

## 2022-07-29 RX ORDER — SODIUM CHLORIDE 9 MG/ML
1000 INJECTION INTRAMUSCULAR; INTRAVENOUS; SUBCUTANEOUS
Refills: 0 | Status: DISCONTINUED | OUTPATIENT
Start: 2022-07-29 | End: 2022-07-31

## 2022-07-29 RX ORDER — CEFAZOLIN SODIUM 1 G
2000 VIAL (EA) INJECTION EVERY 8 HOURS
Refills: 0 | Status: DISCONTINUED | OUTPATIENT
Start: 2022-07-29 | End: 2022-07-29

## 2022-07-29 RX ORDER — DEXTROSE 50 % IN WATER 50 %
25 SYRINGE (ML) INTRAVENOUS
Refills: 0 | Status: DISCONTINUED | OUTPATIENT
Start: 2022-07-29 | End: 2022-07-31

## 2022-07-29 RX ORDER — NICARDIPINE HYDROCHLORIDE 30 MG/1
2 CAPSULE, EXTENDED RELEASE ORAL
Qty: 40 | Refills: 0 | Status: DISCONTINUED | OUTPATIENT
Start: 2022-07-29 | End: 2022-07-31

## 2022-07-29 RX ORDER — PANTOPRAZOLE SODIUM 20 MG/1
40 TABLET, DELAYED RELEASE ORAL
Refills: 0 | Status: DISCONTINUED | OUTPATIENT
Start: 2022-07-29 | End: 2022-07-31

## 2022-07-29 RX ORDER — ATORVASTATIN CALCIUM 80 MG/1
80 TABLET, FILM COATED ORAL AT BEDTIME
Refills: 0 | Status: DISCONTINUED | OUTPATIENT
Start: 2022-07-29 | End: 2022-07-31

## 2022-07-29 RX ORDER — MEPERIDINE HYDROCHLORIDE 50 MG/ML
25 INJECTION INTRAMUSCULAR; INTRAVENOUS; SUBCUTANEOUS ONCE
Refills: 0 | Status: DISCONTINUED | OUTPATIENT
Start: 2022-07-29 | End: 2022-07-30

## 2022-07-29 RX ORDER — INSULIN HUMAN 100 [IU]/ML
1 INJECTION, SOLUTION SUBCUTANEOUS
Qty: 50 | Refills: 0 | Status: DISCONTINUED | OUTPATIENT
Start: 2022-07-29 | End: 2022-07-31

## 2022-07-29 RX ORDER — CHLORHEXIDINE GLUCONATE 213 G/1000ML
5 SOLUTION TOPICAL
Refills: 0 | Status: DISCONTINUED | OUTPATIENT
Start: 2022-07-29 | End: 2022-07-29

## 2022-07-29 RX ORDER — TAMSULOSIN HYDROCHLORIDE 0.4 MG/1
0.4 CAPSULE ORAL AT BEDTIME
Refills: 0 | Status: DISCONTINUED | OUTPATIENT
Start: 2022-07-29 | End: 2022-07-31

## 2022-07-29 RX ORDER — DEXTROSE 50 % IN WATER 50 %
50 SYRINGE (ML) INTRAVENOUS
Refills: 0 | Status: DISCONTINUED | OUTPATIENT
Start: 2022-07-29 | End: 2022-07-31

## 2022-07-29 RX ORDER — CEFAZOLIN SODIUM 1 G
2000 VIAL (EA) INJECTION ONCE
Refills: 0 | Status: COMPLETED | OUTPATIENT
Start: 2022-07-30 | End: 2022-07-30

## 2022-07-29 RX ORDER — ATORVASTATIN CALCIUM 80 MG/1
80 TABLET, FILM COATED ORAL AT BEDTIME
Refills: 0 | Status: DISCONTINUED | OUTPATIENT
Start: 2022-07-29 | End: 2022-07-29

## 2022-07-29 RX ORDER — CHLORHEXIDINE GLUCONATE 213 G/1000ML
1 SOLUTION TOPICAL
Refills: 0 | Status: DISCONTINUED | OUTPATIENT
Start: 2022-07-29 | End: 2022-07-31

## 2022-07-29 RX ADMIN — SODIUM CHLORIDE 3 MILLILITER(S): 9 INJECTION INTRAMUSCULAR; INTRAVENOUS; SUBCUTANEOUS at 05:21

## 2022-07-29 RX ADMIN — NICARDIPINE HYDROCHLORIDE 10 MG/HR: 30 CAPSULE, EXTENDED RELEASE ORAL at 22:45

## 2022-07-29 RX ADMIN — Medication 400 MILLIGRAM(S): at 22:10

## 2022-07-29 RX ADMIN — CHLORHEXIDINE GLUCONATE 1 APPLICATION(S): 213 SOLUTION TOPICAL at 05:19

## 2022-07-29 RX ADMIN — Medication 400 MILLIGRAM(S): at 16:10

## 2022-07-29 RX ADMIN — Medication 1000 MILLIGRAM(S): at 17:41

## 2022-07-29 RX ADMIN — Medication 1000 MILLIGRAM(S): at 23:00

## 2022-07-29 RX ADMIN — Medication 5 MILLIGRAM(S): at 21:08

## 2022-07-29 RX ADMIN — Medication 650 MILLIGRAM(S): at 00:20

## 2022-07-29 RX ADMIN — Medication 12.5 MILLIGRAM(S): at 05:20

## 2022-07-29 RX ADMIN — TAMSULOSIN HYDROCHLORIDE 0.4 MILLIGRAM(S): 0.4 CAPSULE ORAL at 21:08

## 2022-07-29 RX ADMIN — ATORVASTATIN CALCIUM 80 MILLIGRAM(S): 80 TABLET, FILM COATED ORAL at 21:08

## 2022-07-29 RX ADMIN — CHLORHEXIDINE GLUCONATE 10 MILLILITER(S): 213 SOLUTION TOPICAL at 05:20

## 2022-07-29 RX ADMIN — PANTOPRAZOLE SODIUM 40 MILLIGRAM(S): 20 TABLET, DELAYED RELEASE ORAL at 13:20

## 2022-07-29 NOTE — DIETITIAN INITIAL EVALUATION ADULT - ADD RECOMMEND
1. Please advance pt to renal diet as medically feasible; please reconsult nutrition in the event alternative nutrition becomes warranted   >>Monitor %PO intake/diet tolerance   >>Monitor need for ONS   2. Monitor lytes, replete prn   3. Encourage PO intake as appropriate   4. Diet edu reinforcement prn   5. Honor food preferences as able   6. RD will perform NFPE on followup visit if appropriate   7. Monitor wts daily to assess fluid shifts in the setting of HD  8. RD will follow up per protocol  1. Please advance pt to renal diet as medically feasible; please reconsult nutrition in the event alternative nutrition becomes warranted. Fluids per team.   >>Monitor %PO intake/diet tolerance   >>Monitor need for ONS   2. Monitor lytes, replete prn   3. Encourage PO intake as appropriate   4. Diet edu reinforcement prn   5. Honor food preferences as able   6. RD will perform NFPE on followup visit if appropriate   7. Monitor wts daily to assess fluid shifts in the setting of HD  8. RD will follow up per protocol

## 2022-07-29 NOTE — PROGRESS NOTE ADULT - SUBJECTIVE AND OBJECTIVE BOX
Vascular Surgery Post-Op Note    Procedure: Thoracic Endovascular Aortic Repair    Diagnosis/Indication: Thoracic Aortic Aneurysm    Surgeon: Kamille    S: Pt has no complaints. Denies CP, SOB, RAYMOND, calf tenderness. Pain controlled with medication.    O:  T(C): 37.1 (07-29-22 @ 13:30), Max: 37.1 (07-29-22 @ 13:30)  T(F): 98.8 (07-29-22 @ 13:30), Max: 98.8 (07-29-22 @ 13:30)  HR: 71 (07-29-22 @ 13:00) (56 - 74)  BP: --  RR: 14 (07-29-22 @ 13:00) (14 - 18)  SpO2: 98% (07-29-22 @ 13:00) (98% - 100%)  Wt(kg): --                        10.6   11.88 )-----------( 170      ( 29 Jul 2022 10:18 )             32.0     07-29    137  |  99  |  28<H>  ----------------------------<  84  4.7   |  24  |  5.52<H>    Ca    8.5      29 Jul 2022 10:18  Phos  4.4     07-29  Mg     2.0     07-29    TPro  6.1  /  Alb  3.5  /  TBili  0.3  /  DBili  x   /  AST  10  /  ALT  <5<L>  /  AlkPhos  90  07-29      Gen: NAD, resting comfortably in bed  C/V: NSR  Pulm: Nonlabored breathing, no respiratory distress  Abd: soft, Non-tender, non-distended,   Groin: Bilateral groin sites soft, incisions clean dry and intact   Extrem: WWP, no calf edema, SCDs in place  Vascular: Biphasic DP + PT bilaterally, palpable fem/popliteal bilaterally      A/P: 82yMale s/p TEVAR  Pain/nausea control  Dispo plan: CTICU

## 2022-07-29 NOTE — BRIEF OPERATIVE NOTE - NSICDXBRIEFPREOP_GEN_ALL_CORE_FT
PRE-OP DIAGNOSIS:  Thoracic aortic aneurysm 29-Jul-2022 08:43:23  Will Rodriguez  
PRE-OP DIAGNOSIS:  Thoracic aortic aneurysm 29-Jul-2022 08:43:23  Will Rodriguez

## 2022-07-29 NOTE — PROGRESS NOTE ADULT - ASSESSMENT
81M with pmhx of ESRD via left AVF (MWF), HTN, HLD, TEVAR 8/2015 who presented to the hospital in the setting of lumbar drain placement and repeat TEVAR now s/p OR.     Assessment/Plan:   #ESRD on HD MWF  usual Rx 3h 3L   -Electrolytes at goal K of 4.7  -BP noted 150's, per team MAP of  desired  -Will defer HD today and perform HD on 7/30  dry weight TBD     #HTN   BP within CTICU goal  -Continue to monitor    #access   LUE AVF functional     #anemia  Hb within goal   obtain iron studies including ferritin, transferrin, iron, and TIBC to be able to calculate % saturation   Epo w/ HD  transfusion as per primary team     #renal bone disease   Ca ~8.5  Phos ~4.4       Thank you for the opportunity to participate in the care of your patient. The nephrology service remains available to assist with any questions or concerns. Please feel free to reach us by paging the on-call nephrology fellow for urgent issues or as below.     Jeri Barraza D.O.  PGY-5, Nephrology Fellow    P: [phone]

## 2022-07-29 NOTE — DIETITIAN INITIAL EVALUATION ADULT - PERTINENT MEDS FT
MEDICATIONS  (STANDING):  atorvastatin 80 milliGRAM(s) Oral at bedtime  pantoprazole   Suspension 40 milliGRAM(s) Oral before breakfast  tamsulosin 0.4 milliGRAM(s) Oral at bedtime

## 2022-07-29 NOTE — PROGRESS NOTE ADULT - SUBJECTIVE AND OBJECTIVE BOX
CTICU  CRITICAL  CARE  attending     Hand off received 					   Pertinent clinical, laboratory, radiographic, hemodynamic, echocardiographic, respiratory data, microbiologic data and chart were reviewed and analyzed frequently throughout the course of the day  Patient seen and examined with CTS/ SH attending at bedside  Pt is a 82 yr old male with PMH HTN, HLD, CKD on HD via LUE AVF, BPH, hx TEVAR with Dr. Corral 8/15, found to have enlarging aorta prompting admission for repeat TEVAR.    FAMILY HISTORY:  No pertinent family history in first degree relatives    PAST MEDICAL & SURGICAL HISTORY:  H/O: HTN (hypertension)  H/O hyperlipidemia  CKD (chronic kidney disease)  History of appendectomy        Patient is a 82 yr old male with PMH HTN, HLD, CKD on HD via LUE AVF, BPH, hx TEVAR with Dr. Corral 8/15, found to have enlarging aorta prompting admission for repeat TEVAR      14 system review was unremarkable    Vital signs, hemodynamic and respiratory parameters were reviewed from the bedside nursing flowsheet.  ICU Vital Signs Last 24 Hrs  T(C): 35 (2022 10:45), Max: 36.9 (2022 01:17)  T(F): 95 (2022 10:45), Max: 98.4 (2022 01:17)  HR: 61 (2022 11:30) (53 - 78)  BP: 163/50 (2022 00:38) (107/58 - 172/76)  BP(mean): 104 (2022 22:00) (77 - 104)  ABP: 187/55 (2022 11:30) (148/49 - 199/60)  ABP(mean): 105 (2022 11:30) (84 - 112)  RR: 14 (2022 11:00) (14 - 20)  SpO2: 100% (2022 11:30) (92% - 100%)    O2 Parameters below as of 2022 12:00  Patient On (Oxygen Delivery Method): nasal cannula w/ humidification  O2 Flow (L/min): 2        Adult Advanced Hemodynamics Last 24 Hrs  CVP(mm Hg): 6 (2022 11:30) (6 - 11)  CVP(cm H2O): 3 (2022 10:00) (3 - 3)  CO: --  CI: --  PA: --  PA(mean): --  PCWP: --  SVR: --  SVRI: --  PVR: --  PVRI: --, ABG - ( 2022 10:31 )  pH, Arterial: 7.38  pH, Blood: x     /  pCO2: 43    /  pO2: 129   / HCO3: 25    / Base Excess: 0.0   /  SaO2: 99.5                Intake and output was reviewed and the fluid balance was calculated  Daily Height in cm: 170.2 (2022 00:38)    Daily Weight in k.3 (2022 21:45)  I&O's Summary    2022 07:01  -  2022 07:00  --------------------------------------------------------  IN: 450 mL / OUT: 500 mL / NET: -50 mL    2022 07:01  -  2022 11:31  --------------------------------------------------------  IN: 20 mL / OUT: 10 mL / NET: 10 mL        All lines and drain sites were assessed  Glycemic trend was reviewed      Neuro: pleasant, shivering, moving all extremities equally  HEENT: NC  Heart: s1, s2  Lungs: clear bl  Abdomen: soft, nt/nd  Extremities: no le edema      labs  CBC Full  -  ( 2022 10:18 )  WBC Count : 11.88 K/uL  RBC Count : 3.24 M/uL  Hemoglobin : 10.6 g/dL  Hematocrit : 32.0 %  Platelet Count - Automated : 170 K/uL  Mean Cell Volume : 98.8 fl  Mean Cell Hemoglobin : 32.7 pg  Mean Cell Hemoglobin Concentration : 33.1 gm/dL  Auto Neutrophil # : x  Auto Lymphocyte # : x  Auto Monocyte # : x  Auto Eosinophil # : x  Auto Basophil # : x  Auto Neutrophil % : x  Auto Lymphocyte % : x  Auto Monocyte % : x  Auto Eosinophil % : x  Auto Basophil % : x        137  |  99  |  28<H>  ----------------------------<  84  4.7   |  24  |  5.52<H>    Ca    8.5      2022 10:18  Phos  4.4       Mg     2.0         TPro  6.1  /  Alb  3.5  /  TBili  0.3  /  DBili  x   /  AST  10  /  ALT  <5<L>  /  AlkPhos  90      PT/INR - ( 2022 10:18 )   PT: 13.9 sec;   INR: 1.17          PTT - ( 2022 10:18 )  PTT:31.8 sec  The current medications were reviewed   MEDICATIONS  (STANDING):  pantoprazole   Suspension 40 milliGRAM(s) Oral before breakfast    MEDICATIONS  (PRN):      Assessment/Plan:  82 yr old male with PMH HTN, HLD, CKD on HD via LUE AVF, BPH, hx TEVAR with Dr. Corral 8/15, found to have enlarging aorta prompting admission for repeat TEVAR.  Sp lumbar drain by NSGY yesterday.     Sp TEVAR (Dr. Corral)   Lumbar Drain  MAP goal   ESRD on HD per renal  Neuro checks q1h  Flat while LD in place  Nikunj Hugger for hypothermia  Diet as tolerated-CLD  Replete lytes prn  GI/DVT PPX  Bowel Regimen  Pain control    Close hemodynamic, ventilatory and drain monitoring and management per post op routine  Monitor for arrhythmias and monitor parameters for organ perfusion  Monitor neurologic status  Head of the bed should remain elevated to 45 deg   Chest PT and IS will be encouraged  Monitor adequacy of oxygenation and ventilation and attempt to wean oxygen  Antibiotic regimen will be tailored to the clinical, laboratory and microbiologic data  Nutritional goals will be met using po eventually, ensure adequate caloric intake and monitor the same  Stress ulcer and VTE prophylaxis will be achieved    Glycemic control is satisfactory  Electrolytes have been repleted as necessary and wound care has been carried out   Pain control has been achieved.   Aggressive physical therapy and early mobility and ambulation goals will be met   The family was updated about the course and plan.    CRITICAL CARE TIME personally provided by me  in evaluation and management, reassessments, review and interpretation of labs and x-rays, ventilator and hemodynamic management, formulating a plan and coordinating care: ___90____ MIN.  Time does not include procedural time.    CTICU ATTENDING     					  Zoe De Leon MD

## 2022-07-29 NOTE — PROGRESS NOTE ADULT - SUBJECTIVE AND OBJECTIVE BOX
--------------------------------------------------------------------------------  Chief Complaint: ESRD/Ongoing hemodialysis requirement    24 hour events/subjective:  Seen this AM after TEVAR performed. Labs stable K of 4.7. BP in the 150's. Discussed with CTICU goal MAP is between . No overt indication for HD today.   Underwent HD last night for pre-op prep. Will have HD tomorrow 7/30.       PAST HISTORY  --------------------------------------------------------------------------------  No significant changes to PMH, PSH, FHx, SHx, unless otherwise noted    ALLERGIES & MEDICATIONS  --------------------------------------------------------------------------------  Allergies    No Known Allergies    Intolerances      Standing Inpatient Medications  pantoprazole   Suspension 40 milliGRAM(s) Oral before breakfast    PRN Inpatient Medications      REVIEW OF SYSTEMS  --------------------------------------------------------------------------------  All other systems were reviewed and are negative, except as noted.    VITALS/PHYSICAL EXAM  --------------------------------------------------------------------------------  T(C): 35 (07-29-22 @ 10:45), Max: 36.9 (07-29-22 @ 01:17)  HR: 61 (07-29-22 @ 11:30) (53 - 78)  BP: 163/50 (07-29-22 @ 00:38) (107/58 - 172/76)  RR: 14 (07-29-22 @ 11:00) (14 - 20)  SpO2: 100% (07-29-22 @ 11:30) (92% - 100%)  Wt(kg): --  Drug Dosing Weight  Height (cm): 170.2 (29 Jul 2022 00:38)  Weight (kg): 51.8 (29 Jul 2022 00:38)  BMI (kg/m2): 17.9 (29 Jul 2022 00:38)  BSA (m2): 1.59 (29 Jul 2022 00:38)  Height (cm): 170.2 (07-29-22 @ 00:38)  Weight (kg): 51.8 (07-29-22 @ 00:38)  BMI (kg/m2): 17.9 (07-29-22 @ 00:38)  BSA (m2): 1.59 (07-29-22 @ 00:38)      07-28-22 @ 07:01  -  07-29-22 @ 07:00  --------------------------------------------------------  IN: 450 mL / OUT: 500 mL / NET: -50 mL    07-29-22 @ 07:01  -  07-29-22 @ 11:59  --------------------------------------------------------  IN: 20 mL / OUT: 10 mL / NET: 10 mL      PHYSICAL EXAM:  GENERAL: Alert, awake, oriented x3 on NC  HEENT: HERRERA, EOMI, neck supple, no JVP  CHEST/LUNG: Bilateral clear breath sounds  HEART: Regular rate and rhythm, no murmur, no gallops, no rub   ABDOMEN: Soft, nontender, non distended  EXTREMITIES: no pedal edema  Neurology: AAOx3, no asterixis   SKIN: No rash or skin lesion   ACCESS: LUE AVF w/bruit and thrill     LABS/STUDIES  --------------------------------------------------------------------------------              10.6   11.88 >-----------<  170      [07-29-22 @ 10:18]              32.0     137  |  99  |  28  ----------------------------<  84      [07-29-22 @ 10:18]  4.7   |  24  |  5.52        Ca     8.5     [07-29-22 @ 10:18]      Mg     2.0     [07-29-22 @ 10:18]      Phos  4.4     [07-29-22 @ 10:18]    TPro  6.1  /  Alb  3.5  /  TBili  0.3  /  DBili  x   /  AST  10  /  ALT  <5  /  AlkPhos  90  [07-29-22 @ 10:18]    PT/INR: PT 13.9 , INR 1.17       [07-29-22 @ 10:18]  PTT: 31.8       [07-29-22 @ 10:18]      TSH 1.060      [07-28-22 @ 16:00]  Lipid: chol 140, , HDL 41, LDL --      [07-28-22 @ 16:00]    HBsAb Nonreact      [07-28-22 @ 20:01]  HBsAg Nonreact      [07-28-22 @ 20:01]  HCV 0.04, Nonreact      [07-28-22 @ 20:01]      RADIOLOGY:  --------------------------------------------------------------------------------------

## 2022-07-29 NOTE — BRIEF OPERATIVE NOTE - NSICDXBRIEFPOSTOP_GEN_ALL_CORE_FT
POST-OP DIAGNOSIS:  Thoracic aortic aneurysm 29-Jul-2022 08:43:32  Will Rodriguez  
POST-OP DIAGNOSIS:  Thoracic aortic aneurysm 29-Jul-2022 08:43:32  Will Rodriguez

## 2022-07-29 NOTE — DIETITIAN INITIAL EVALUATION ADULT - DIET TYPE
Please advance diet within 24 hours/as medically feasible/renal replacement pts:no protein restr,no conc K & phos, low sodium

## 2022-07-29 NOTE — CONSULT NOTE ADULT - ASSESSMENT
81 year old male with a past medical history of hypertension, hyperlipidemia, 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 Corral for evaluation and management of distal thoracic aortic aneurysm with repeat diagnostic imaging. 22 CT C/A/P w/o contrast revealed Interval enlargement of the distal aortic arch, which now measures 5.9 cm (previously 5.7 cm) and distal descending thoracic aorta which now measures 5.7 cm (previously 5.2 cm). Patient now presents to [facility] for presurgical workup for Lumbar Drain placement and TEVAR.  Nephrology consulted for dialysis.     ESRD on HD requires  dialysis pre and post op     Plan:   Hemodialysis, Access: Arteriovenous Fistula    Dialyzer: Optiflux [id number], Time: 120 Min    Blood Flow: 400 mL/Min , Dialysate Flow: 500 mL/Min, Dialysate Temp: 36.5, Tubinmm (Adult)    Target Fluid Removal: 0 Liters    Dialysate Electrolytes (mEq/L): Potassium 3, Calcium 2.5, Sodium 138, Bicarbonate 35 (22 @ 18:35) [Completed]

## 2022-07-29 NOTE — PROGRESS NOTE ADULT - ATTENDING COMMENTS
ESRD on HD TTS s/p TEVAR today, doing well post op, good volume status, electrolytes acceptable. Plan for HD tomorrow

## 2022-07-29 NOTE — CONSULT NOTE ADULT - SUBJECTIVE AND OBJECTIVE BOX
HISTORY OF PRESENT ILLNESS:   81 year old male with a past medical history of hypertension, hyperlipidemia, 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 Corral for evaluation and management of distal thoracic aortic aneurysm with repeat diagnostic imaging. 5/5/22 CT C/A/P w/o contrast revealed Interval enlargement of the distal aortic arch, which now measures 5.9 cm (previously 5.7 cm) and distal descending thoracic aorta which now measures 5.7 cm (previously 5.2 cm). Patient now presents to [facility] for presurgical workup for Lumbar Drain placement and TEVAR. Denies any acute complaints. Denies CP, SOB, Palpitations, RAYMOND, PND, N/V, Fever, Chills.    Neurosurgery consulted for lumbar drain placement for TEVAR procedure on 7/29.      PAST MEDICAL & SURGICAL HISTORY:  H/O: HTN (hypertension)      H/O hyperlipidemia      CKD (chronic kidney disease)      History of appendectomy      FAMILY HISTORY:  No pertinent family history in first degree relatives      SOCIAL HISTORY:  Tobacco Use:  EtOH use:   Substance:    Allergies    No Known Allergies    Intolerances        REVIEW OF SYSTEMS  pertinent ROS as listed in above HPI	      MEDICATIONS:  Antibiotics:  ceFAZolin   IVPB 2000 milliGRAM(s) IV Intermittent once    Neuro:  acetaminophen     Tablet .. 650 milliGRAM(s) Oral once  fentaNYL    Injectable 50 MICROGram(s) IV Push once  midazolam Injectable 2 milliGRAM(s) IV Push once    Anticoagulation:    OTHER:  atorvastatin 80 milliGRAM(s) Oral at bedtime  lidocaine 1%/epinephrine 1:100,000 Inj 10 milliLiter(s) Local Injection once  pantoprazole    Tablet 40 milliGRAM(s) Oral before breakfast    IVF:  sodium chloride 0.9% lock flush 3 milliLiter(s) IV Push every 8 hours      Vital Signs Last 24 Hrs  T(C): 36.7 (28 Jul 2022 17:12), Max: 36.7 (28 Jul 2022 17:12)  T(F): 98.1 (28 Jul 2022 17:12), Max: 98.1 (28 Jul 2022 17:12)  HR: 63 (28 Jul 2022 18:29) (59 - 73)  BP: 129/60 (28 Jul 2022 18:29) (107/58 - 129/60)  BP(mean): 86 (28 Jul 2022 18:29) (77 - 86)  RR: 17 (28 Jul 2022 18:29) (17 - 18)  SpO2: 98% (28 Jul 2022 18:29) (92% - 98%)    Parameters below as of 28 Jul 2022 18:29  Patient On (Oxygen Delivery Method): room air        PHYSICAL EXAM:  General: NAD, comfortably lying in bed, on room air  Cardiovascular: regular rate and rhythm   Respiratory: nonlabored breathing, normal chest rise   GI: abdomen soft, nontender, nondistended  Neuro: AAOx3, following commands, speech clear, no aphasia  CN II-XII grossly intact, PERRL 3mm briskly reactive, EOMI, face symmetric  DUDLEY x4 spontaneously, strength 5/5 in all extremities throughout, no pronator drift, no dysmetria  Sensation intact to light touch throughout b/l  Extremities: distal pulses 2+ x4        LABS:                        9.7    8.66  )-----------( 173      ( 28 Jul 2022 16:00 )             29.6     07-28    139  |  96  |  34<H>  ----------------------------<  86  4.2   |  30  |  6.57<H>    Ca    9.6      28 Jul 2022 16:00    TPro  6.6  /  Alb  4.1  /  TBili  0.2  /  DBili  x   /  AST  12  /  ALT  <5<L>  /  AlkPhos  79  07-28    PT/INR - ( 28 Jul 2022 16:00 )   PT: 13.3 sec;   INR: 1.12          PTT - ( 28 Jul 2022 16:00 )  PTT:28.2 sec    CULTURES:      RADIOLOGY & ADDITIONAL STUDIES:    Assessment:  81 year old male with a past medical history of hypertension, hyperlipidemia, CKD stage 4 on HD via left AVF, renal cysts, BPH, s/p TEVAR 8/20/15, admitted for presurgical workup for TEVAR procedure on 7/29, for lumbar drain placement on 7/28.      Plan:  - All labs and imaging reviewed   - Lumbar drain placement, for presurgical management for TEVAR   - d/w Dr. Costello   
  HPI:  81 year old male with a past medical history of hypertension, hyperlipidemia, 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 Corral for evaluation and management of distal thoracic aortic aneurysm with repeat diagnostic imaging. 5/5/22 CT C/A/P w/o contrast revealed Interval enlargement of the distal aortic arch, which now measures 5.9 cm (previously 5.7 cm) and distal descending thoracic aorta which now measures 5.7 cm (previously 5.2 cm). Patient now presents to [facility] for presurgical workup for Lumbar Drain placement and TEVAR.  Nephrology consulted for dialysis.     PAST MEDICAL & SURGICAL HISTORY:  H/O: HTN (hypertension)      H/O hyperlipidemia      CKD (chronic kidney disease)      History of appendectomy            Allergies:  No Known Allergies      Home Medications:       Hospital Medications:   MEDICATIONS  (STANDING):      SOCIAL HISTORY:  Denies ETOh, Smoking,     Family History:  FAMILY HISTORY:  No pertinent family history in first degree relatives          VITALS:  T(F): 98.4 (07-29-22 @ 05:14), Max: 98.4 (07-29-22 @ 01:17)  HR: 69 (07-29-22 @ 06:00)  BP: 163/50 (07-29-22 @ 00:38)  RR: 16 (07-29-22 @ 06:00)  SpO2: 97% (07-29-22 @ 06:00)  Wt(kg): --    07-28 @ 07:01  -  07-29 @ 07:00  --------------------------------------------------------  IN: 450 mL / OUT: 500 mL / NET: -50 mL      Height (cm): 170.2 (07-29 @ 00:38)  Weight (kg): 51.8 (07-29 @ 00:38)  BMI (kg/m2): 17.9 (07-29 @ 00:38)  BSA (m2): 1.59 (07-29 @ 00:38)  CAPILLARY BLOOD GLUCOSE          Review of Systems:  CONSTITUTIONAL: No fever   RESPIRATORY: No shortness of breath, cough  CARDIOVASCULAR: No Chest pain, shortness of breath  GASTROINTESTINAL: No abdominal pain, nausea, vomiting, diarrhea  GENITOURINARY: No urinary frequency, gross hematuria, dysuria  NEUROLOGICAL: No headache, weakness  SKIN: No rash or skin lesion  MUSCULOSKELETAL: No swelling  Psych: Denies suicidal or homicidal ideation    PHYSICAL EXAM:  GENERAL: Alert, awake, oriented x3 on RA   HEENT: HERRERA, EOMI, neck supple, no JVP  CHEST/LUNG: Bilateral clear breath sounds  HEART: Regular rate and rhythm, no murmur, no gallops, no rub   ABDOMEN: Soft, nontender, non distended  : No flank or supra pubic tenderness.  EXTREMITIES: no pedal edema  Neurology: AAOx3, no asterixis   SKIN: No rash or skin lesion   ACCESS: LUE AVF w/bruit and thrill     LABS:  07-29    137  |  97  |  25<H>  ----------------------------<  80  4.0   |  28  |  5.23<H>    Ca    8.9      29 Jul 2022 03:17  Phos  3.7     07-29  Mg     1.8     07-29    TPro  6.1  /  Alb  3.7  /  TBili  0.4  /  DBili      /  AST  13  /  ALT  5<L>  /  AlkPhos  79  07-29    Creatinine Trend: 5.23 <--, 6.57 <--                        9.8    9.63  )-----------( 165      ( 29 Jul 2022 03:17 )             29.6     Urine Studies:

## 2022-07-29 NOTE — DIETITIAN INITIAL EVALUATION ADULT - OTHER CALCULATIONS
%IBW: 72; IBW used to calculate estimated needs d/t pt being <80%IBW. Adjusted for HD, post-op wound healing needs. Please aim for upper end of needs. Fluids per team.

## 2022-07-29 NOTE — DIETITIAN INITIAL EVALUATION ADULT - WEIGHT IN LBS
Progress Notes by Joseph Burton Joseph Burton DPM  [facility]      HPI: Anne Mcnally Joseph Burton DPM;  Location: [facility];  I will STOP taking the medications listed below when I get home from the hospital:    cefuroxime 500 mg oral tablet  -- 1 tab(s) by mouth 2 times a day   -- Finish all this medication unless otherwise directed by prescriber.  Medication should be taken with plenty of water.  Take with food or milk. 113.9

## 2022-07-29 NOTE — DIETITIAN INITIAL EVALUATION ADULT - REASON
Not appropriate at time of assessment, RD will perform NFPE on followup if possible. Did not observe s/sx of wasting on observation.

## 2022-07-29 NOTE — DIETITIAN INITIAL EVALUATION ADULT - PERTINENT LABORATORY DATA
07-29    137  |  99  |  28<H>  ----------------------------<  84  4.7   |  24  |  5.52<H>    Ca    8.5      29 Jul 2022 10:18  Phos  4.4     07-29  Mg     2.0     07-29    TPro  6.1  /  Alb  3.5  /  TBili  0.3  /  DBili  x   /  AST  10  /  ALT  <5<L>  /  AlkPhos  90  07-29  A1C with Estimated Average Glucose Result: 4.8 % (07-28-22 @ 16:00)

## 2022-07-29 NOTE — PRE-OP CHECKLIST - HAND OFF
Physical Therapy Discharge Summary    Name: Brittaney Joseph  MRN: [id number]   Principal Problem: Hypoglycemia     Patient Discharged from acute Physical Therapy on 2018.  Please refer to prior PT noted date on 2018 for functional status.     Assessment:     Patient appropriate for care in another setting.    Objective:     GOALS:   Multidisciplinary Problems     Physical Therapy Goals     Not on file          Multidisciplinary Problems (Resolved)        Problem: Physical Therapy Goal    Goal Priority Disciplines Outcome Goal Variances Interventions   Physical Therapy Goal   (Resolved)     PT, PT/OT Outcome(s) achieved     Description:  Goals to be met by: 2019     Patient will increase functional independence with mobility by performin. Supine to sit with Modified [place]  2. Sit to stand transfer with Modified [place]  3. Gait  x 200 feet with Modified [place] using Rolling Walker.                       Reasons for Discontinuation of Therapy Services  Transfer to alternate level of care.      Plan:     Patient Discharged to: Home with Home Health Service.    Brannon Kern, PT  2018  
Unit RN to OR RN

## 2022-07-29 NOTE — DIETITIAN INITIAL EVALUATION ADULT - OTHER INFO
81M with pmhx of ESRD via left AVF (MWF), HTN, HLD, TEVAR 8/2015 who presented to the hospital in the setting of lumbar drain placement and repeat TEVAR now s/p OR. Nephrology following, plan for HD tomorrow 7/30.     Pt seen at bedside for initial assessment. Attempted to see patient 2x. First time pt was in OR, second time pt had just gotten back. Assessment limited due to pt being in pain/cold right after OR. NKFA as per EMR.  No edema documented at this time. No pressure ulcers documented at this time. Bl groin sx incisions. Joseph score=16. Unable to perform NFPE, RD will perform on followup at more appropriate time. Spoke with pt's daughter on the phone who said the patient's appetite hasn't changed over the last few months. The pt eats 3 meals/day plus snacks. The pt goes to dialysis outpatient where he sees a dietitian. According to the daughter, the pt has lost 12 lbs in the last 6-12 months, reflecting a 10% wt loss in >6 mos, insignificant according to ASP[employer] standards, but concerning. Malnutrition diagnosis pending NFPE as pt does not meet criteria otherwise. Pt is currently NPO. As per daughter, pt reports feeling hungry during hospital stay, wants to eat. Attempted to get in contact with team, awaiting response. Made aware RD remains available. RD to follow up per protocol. See nutrition recommendations below.  82M with pmhx of ESRD via left AVF (MWF), HTN, HLD, TEVAR 8/2015 who presented to the hospital in the setting of lumbar drain placement and repeat TEVAR now s/p OR. Nephrology following, plan for HD tomorrow 7/30.     Pt seen at bedside for initial assessment. Attempted to see patient 2x. First time pt was in OR, second time pt had just gotten back. Assessment limited due to pt being in pain/cold right after OR. NKFA as per EMR.  No edema documented at this time. No pressure ulcers documented at this time. Bl groin sx incisions. Joseph score=16. Unable to perform NFPE, RD will perform on followup at more appropriate time. Spoke with pt's daughter on the phone who said the patient's appetite hasn't changed over the last few months. The pt eats 3 meals/day plus snacks. The pt goes to dialysis outpatient where he sees a dietitian. According to the daughter, the pt has lost 12 lbs in the last 6-12 months, reflecting a 10% wt loss in >6 mos, insignificant according to [employer] standards, but concerning. Malnutrition diagnosis pending NFPE as pt does not meet criteria otherwise. Pt is currently NPO. As per daughter, pt reports feeling hungry during hospital stay, wants to eat. Attempted to get in contact with team, awaiting response. Made aware RD remains available. RD to follow up per protocol. See nutrition recommendations below.

## 2022-07-29 NOTE — BRIEF OPERATIVE NOTE - OPERATION/FINDINGS
Procedure: TEVAR deployed from previous TEVAR to proximal to celiac using Relay Pro 40x14. Completion showed aneurysm exclusion.  Max Sheath: 21Fr right groin, perclosed x2. 5Fr left groin, manual pressure held.  Contrast: 40cc  Pulses: R dp monophasic, R PT, triphasic, L DP monophasic, L PT biphasic

## 2022-07-29 NOTE — PRE-OP CHECKLIST - SELECT TESTS ORDERED
CBC/CMP/PT/PTT/INR/Hepatic Function/Spirometry/Type and Cross/Type and Screen/EKG/Results in MD note/POCT Blood Glucose/COVID-19

## 2022-07-29 NOTE — BRIEF OPERATIVE NOTE - OPERATION/FINDINGS
TEVAR TEVAR   Relay Pro 40x14  Right femoral artery 21 Fr, perclosed  Left femoral artery 5 Fr, manual pressure

## 2022-07-30 LAB
ALBUMIN SERPL ELPH-MCNC: 3.6 G/DL — SIGNIFICANT CHANGE UP (ref 3.3–5)
ALP SERPL-CCNC: 91 U/L — SIGNIFICANT CHANGE UP (ref 40–120)
ALT FLD-CCNC: <5 U/L — LOW (ref 10–45)
ANION GAP SERPL CALC-SCNC: 20 MMOL/L — HIGH (ref 5–17)
APTT BLD: 29.1 SEC — SIGNIFICANT CHANGE UP (ref 27.5–35.5)
AST SERPL-CCNC: 11 U/L — SIGNIFICANT CHANGE UP (ref 10–40)
BASOPHILS # BLD AUTO: 0.05 K/UL — SIGNIFICANT CHANGE UP (ref 0–0.2)
BASOPHILS NFR BLD AUTO: 0.4 % — SIGNIFICANT CHANGE UP (ref 0–2)
BILIRUB SERPL-MCNC: 0.2 MG/DL — SIGNIFICANT CHANGE UP (ref 0.2–1.2)
BUN SERPL-MCNC: 38 MG/DL — HIGH (ref 7–23)
CALCIUM SERPL-MCNC: 8.3 MG/DL — LOW (ref 8.4–10.5)
CHLORIDE SERPL-SCNC: 96 MMOL/L — SIGNIFICANT CHANGE UP (ref 96–108)
CO2 SERPL-SCNC: 19 MMOL/L — LOW (ref 22–31)
CREAT SERPL-MCNC: 6.82 MG/DL — HIGH (ref 0.5–1.3)
EGFR: 7 ML/MIN/1.73M2 — LOW
EOSINOPHIL # BLD AUTO: 0.07 K/UL — SIGNIFICANT CHANGE UP (ref 0–0.5)
EOSINOPHIL NFR BLD AUTO: 0.5 % — SIGNIFICANT CHANGE UP (ref 0–6)
GAS PNL BLDA: SIGNIFICANT CHANGE UP
GLUCOSE SERPL-MCNC: 97 MG/DL — SIGNIFICANT CHANGE UP (ref 70–99)
HCT VFR BLD CALC: 29.6 % — LOW (ref 39–50)
HGB BLD-MCNC: 9.9 G/DL — LOW (ref 13–17)
IMM GRANULOCYTES NFR BLD AUTO: 0.5 % — SIGNIFICANT CHANGE UP (ref 0–1.5)
INR BLD: 1.16 — SIGNIFICANT CHANGE UP (ref 0.88–1.16)
LYMPHOCYTES # BLD AUTO: 1.28 K/UL — SIGNIFICANT CHANGE UP (ref 1–3.3)
LYMPHOCYTES # BLD AUTO: 9.6 % — LOW (ref 13–44)
MAGNESIUM SERPL-MCNC: 1.9 MG/DL — SIGNIFICANT CHANGE UP (ref 1.6–2.6)
MCHC RBC-ENTMCNC: 32.5 PG — SIGNIFICANT CHANGE UP (ref 27–34)
MCHC RBC-ENTMCNC: 33.4 GM/DL — SIGNIFICANT CHANGE UP (ref 32–36)
MCV RBC AUTO: 97 FL — SIGNIFICANT CHANGE UP (ref 80–100)
MONOCYTES # BLD AUTO: 1.07 K/UL — HIGH (ref 0–0.9)
MONOCYTES NFR BLD AUTO: 8 % — SIGNIFICANT CHANGE UP (ref 2–14)
NEUTROPHILS # BLD AUTO: 10.76 K/UL — HIGH (ref 1.8–7.4)
NEUTROPHILS NFR BLD AUTO: 81 % — HIGH (ref 43–77)
NRBC # BLD: 0 /100 WBCS — SIGNIFICANT CHANGE UP (ref 0–0)
PHOSPHATE SERPL-MCNC: 5.8 MG/DL — HIGH (ref 2.5–4.5)
PLATELET # BLD AUTO: 161 K/UL — SIGNIFICANT CHANGE UP (ref 150–400)
POTASSIUM SERPL-MCNC: 4.5 MMOL/L — SIGNIFICANT CHANGE UP (ref 3.5–5.3)
POTASSIUM SERPL-SCNC: 4.5 MMOL/L — SIGNIFICANT CHANGE UP (ref 3.5–5.3)
PROT SERPL-MCNC: 6.1 G/DL — SIGNIFICANT CHANGE UP (ref 6–8.3)
PROTHROM AB SERPL-ACNC: 13.8 SEC — HIGH (ref 10.5–13.4)
RBC # BLD: 3.05 M/UL — LOW (ref 4.2–5.8)
RBC # FLD: 14.5 % — SIGNIFICANT CHANGE UP (ref 10.3–14.5)
SODIUM SERPL-SCNC: 135 MMOL/L — SIGNIFICANT CHANGE UP (ref 135–145)
WBC # BLD: 13.3 K/UL — HIGH (ref 3.8–10.5)
WBC # FLD AUTO: 13.3 K/UL — HIGH (ref 3.8–10.5)

## 2022-07-30 PROCEDURE — 99292 CRITICAL CARE ADDL 30 MIN: CPT

## 2022-07-30 PROCEDURE — 99291 CRITICAL CARE FIRST HOUR: CPT

## 2022-07-30 PROCEDURE — 71045 X-RAY EXAM CHEST 1 VIEW: CPT | Mod: 26

## 2022-07-30 PROCEDURE — 99232 SBSQ HOSP IP/OBS MODERATE 35: CPT | Mod: GC

## 2022-07-30 RX ORDER — ERYTHROPOIETIN 10000 [IU]/ML
5000 INJECTION, SOLUTION INTRAVENOUS; SUBCUTANEOUS ONCE
Refills: 0 | Status: DISCONTINUED | OUTPATIENT
Start: 2022-07-30 | End: 2022-07-31

## 2022-07-30 RX ORDER — METOPROLOL TARTRATE 50 MG
12.5 TABLET ORAL EVERY 6 HOURS
Refills: 0 | Status: DISCONTINUED | OUTPATIENT
Start: 2022-07-30 | End: 2022-07-31

## 2022-07-30 RX ORDER — AMLODIPINE BESYLATE 2.5 MG/1
5 TABLET ORAL DAILY
Refills: 0 | Status: DISCONTINUED | OUTPATIENT
Start: 2022-07-30 | End: 2022-07-31

## 2022-07-30 RX ADMIN — ATORVASTATIN CALCIUM 80 MILLIGRAM(S): 80 TABLET, FILM COATED ORAL at 21:28

## 2022-07-30 RX ADMIN — Medication 5 MILLIGRAM(S): at 21:28

## 2022-07-30 RX ADMIN — AMLODIPINE BESYLATE 5 MILLIGRAM(S): 2.5 TABLET ORAL at 16:47

## 2022-07-30 RX ADMIN — Medication 12.5 MILLIGRAM(S): at 12:30

## 2022-07-30 RX ADMIN — Medication 650 MILLIGRAM(S): at 22:18

## 2022-07-30 RX ADMIN — Medication 100 MILLIGRAM(S): at 00:06

## 2022-07-30 RX ADMIN — PANTOPRAZOLE SODIUM 40 MILLIGRAM(S): 20 TABLET, DELAYED RELEASE ORAL at 06:36

## 2022-07-30 RX ADMIN — Medication 12.5 MILLIGRAM(S): at 20:01

## 2022-07-30 RX ADMIN — Medication 650 MILLIGRAM(S): at 21:28

## 2022-07-30 RX ADMIN — CHLORHEXIDINE GLUCONATE 1 APPLICATION(S): 213 SOLUTION TOPICAL at 06:36

## 2022-07-30 RX ADMIN — TAMSULOSIN HYDROCHLORIDE 0.4 MILLIGRAM(S): 0.4 CAPSULE ORAL at 21:29

## 2022-07-30 NOTE — PROGRESS NOTE ADULT - SUBJECTIVE AND OBJECTIVE BOX
CTICU  CRITICAL  CARE  attending     Hand off received 					   Pertinent clinical, laboratory, radiographic, hemodynamic, echocardiographic, respiratory data, microbiologic data and chart were reviewed and analyzed frequently throughout the course of the day  Patient seen and examined with CTS/ SH attending at bedside  Pt is a 82y , Male, HEALTH ISSUES - PROBLEM Dx:      , FAMILY HISTORY:  No pertinent family history in first degree relatives    PAST MEDICAL & SURGICAL HISTORY:  H/O: HTN (hypertension)      H/O hyperlipidemia      CKD (chronic kidney disease)      History of appendectomy        Patient is a 82y old  Male who presents with a chief complaint of Distal Thoracic Aortic Aneurysm (30 Jul 2022 15:17)      14 system review was unremarkable    Vital signs, hemodynamic and respiratory parameters were reviewed from the bedside nursing flowsheet.  ICU Vital Signs Last 24 Hrs  T(C): 37.4 (30 Jul 2022 17:57), Max: 37.7 (29 Jul 2022 22:42)  T(F): 99.4 (30 Jul 2022 17:57), Max: 99.8 (29 Jul 2022 22:42)  HR: 82 (30 Jul 2022 20:00) (73 - 99)  BP: --  BP(mean): --  ABP: 164/56 (30 Jul 2022 20:00) (119/78 - 182/66)  ABP(mean): 101 (30 Jul 2022 20:00) (78 - 115)  RR: 20 (30 Jul 2022 20:00) (14 - 23)  SpO2: 94% (30 Jul 2022 20:00) (94% - 99%)    O2 Parameters below as of 30 Jul 2022 21:00  Patient On (Oxygen Delivery Method): room air          Adult Advanced Hemodynamics Last 24 Hrs  CVP(mm Hg): 12 (30 Jul 2022 20:00) (-9 - 35)  CVP(cm H2O): --  CO: --  CI: --  PA: --  PA(mean): --  PCWP: --  SVR: --  SVRI: --  PVR: --  PVRI: --, ABG - ( 30 Jul 2022 03:14 )  pH, Arterial: 7.44  pH, Blood: x     /  pCO2: 28    /  pO2: 93    / HCO3: 19    / Base Excess: -3.8  /  SaO2: 97.9                Intake and output was reviewed and the fluid balance was calculated  Daily     Daily   I&O's Summary    29 Jul 2022 07:01  -  30 Jul 2022 07:00  --------------------------------------------------------  IN: 592.5 mL / OUT: 210 mL / NET: 382.5 mL    30 Jul 2022 07:01  -  30 Jul 2022 20:30  --------------------------------------------------------  IN: 875 mL / OUT: 0 mL / NET: 875 mL        All lines and drain sites were assessed  Glycemic trend was reviewedCAPILLARY BLOOD GLUCOSE          Neuro:  HEENT:  Heart:  Lungs:  Abdomen:  Extremities:      labs  CBC Full  -  ( 30 Jul 2022 03:13 )  WBC Count : 13.30 K/uL  RBC Count : 3.05 M/uL  Hemoglobin : 9.9 g/dL  Hematocrit : 29.6 %  Platelet Count - Automated : 161 K/uL  Mean Cell Volume : 97.0 fl  Mean Cell Hemoglobin : 32.5 pg  Mean Cell Hemoglobin Concentration : 33.4 gm/dL  Auto Neutrophil # : 10.76 K/uL  Auto Lymphocyte # : 1.28 K/uL  Auto Monocyte # : 1.07 K/uL  Auto Eosinophil # : 0.07 K/uL  Auto Basophil # : 0.05 K/uL  Auto Neutrophil % : 81.0 %  Auto Lymphocyte % : 9.6 %  Auto Monocyte % : 8.0 %  Auto Eosinophil % : 0.5 %  Auto Basophil % : 0.4 %    07-30    135  |  96  |  38<H>  ----------------------------<  97  4.5   |  19<L>  |  6.82<H>    Ca    8.3<L>      30 Jul 2022 03:13  Phos  5.8     07-30  Mg     1.9     07-30    TPro  6.1  /  Alb  3.6  /  TBili  0.2  /  DBili  x   /  AST  11  /  ALT  <5<L>  /  AlkPhos  91  07-30    PT/INR - ( 30 Jul 2022 03:13 )   PT: 13.8 sec;   INR: 1.16          PTT - ( 30 Jul 2022 03:13 )  PTT:29.1 sec  The current medications were reviewed   MEDICATIONS  (STANDING):  amLODIPine   Tablet 5 milliGRAM(s) Oral daily  atorvastatin 80 milliGRAM(s) Oral at bedtime  chlorhexidine 2% Cloths 1 Application(s) Topical <User Schedule>  dextrose 50% Injectable 50 milliLiter(s) IV Push every 15 minutes  dextrose 50% Injectable 25 milliLiter(s) IV Push every 15 minutes  epoetin zari-epbx (RETACRIT) Injectable 5000 Unit(s) IV Push once  insulin regular Infusion 1 Unit(s)/Hr (1 mL/Hr) IV Continuous <Continuous>  melatonin 5 milliGRAM(s) Oral at bedtime  metoprolol tartrate 12.5 milliGRAM(s) Oral every 6 hours  niCARdipine Infusion 2 mG/Hr (10 mL/Hr) IV Continuous <Continuous>  pantoprazole   Suspension 40 milliGRAM(s) Oral before breakfast  sodium chloride 0.9%. 1000 milliLiter(s) (10 mL/Hr) IV Continuous <Continuous>  tamsulosin 0.4 milliGRAM(s) Oral at bedtime    MEDICATIONS  (PRN):  acetaminophen     Tablet .. 650 milliGRAM(s) Oral every 6 hours PRN Mild Pain (1 - 3)      Assessment/Plan:         s/p cardiac surgery    Acute systolic and diastolic heart failure evidenced by SOB and parenchymal infiltrates; will treat with diuresis    Cardiogenic shock on ionotropy    Vasogenic shock due to hypotension in the cticu , will keep on pressors    Hypovolemic shock - > 20% intravascular depletion will replete volume    Acute blood loss anemia with relative hypotension treated with > 1 unit PC    Acute respiratory failure ruled in due to hypoxemia, O2 sats < 91% on RA treated with HFNC    Acute respiratory failure ruled in due to hypercapnea on abg will treat with mechanical ventilation    Acute respiratory failure ruled in due to prolonged mechanical ventilation > 24 hrs on the vent due to failure to wean due to weak respiratory mechanics    Toxic metabolic encephalopathy ; sundowning due to anesthesia pain medications    Acidosis evidenced by anion gap and negative base excess    Acute kidney injury - creatinine > 0.3 due to combined prerenal and intrarenal factors can presume ATN    ESRD    Acute briana-operative ischemic stroke    Moderate protein calorie malutrition    Diet as tolerated  Replete lytes prn  Monitor CT output  GI/DVT PPX  Bowel Regimen  Pain control  OOB with PT      Close hemodynamic, ventilatory and drain monitoring and management per post op routine  Monitor for arrhythmias and monitor parameters for organ perfusion  Beta blockade not administered due to hemodynamic instability and bradycardia  Monitor neurologic status  Head of the bed should remain elevated to 45 deg   Chest PT and IS will be encouraged  Monitor adequacy of oxygenation and ventilation and attempt to wean oxygen  Antibiotic regimen will be tailored to the clinical, laboratory and microbiologic data  Nutritional goals will be met using po eventually, ensure adequate caloric intake and monitor the same  Stress ulcer and VTE prophylaxis will be achieved    Glycemic control is satisfactory  Electrolytes have been repleted as necessary and wound care has been carried out   Pain control has been achieved.   Aggressive physical therapy and early mobility and ambulation goals will be met   The family was updated about the course and plan.    CRITICAL CARE TIME personally provided by me  in evaluation and management, reassessments, review and interpretation of labs and x-rays, ventilator and hemodynamic management, formulating a plan and coordinating care: __30____ MIN.  Time does not include procedural time.    CTICU ATTENDING     					  Zoe De Leon MD CTICU  CRITICAL  CARE  attending     Hand off received 					   Pertinent clinical, laboratory, radiographic, hemodynamic, echocardiographic, respiratory data, microbiologic data and chart were reviewed and analyzed frequently throughout the course of the day  Patient seen and examined with CTS/ SH attending at bedside  Pt is a 82 yr old male with PMH HTN, HLD, CKD on HD via LUE AVF, BPH, hx TEVAR with Dr. Corral 8/15, found to have enlarging aorta prompting admission for repeat TEVAR.    FAMILY HISTORY:  No pertinent family history in first degree relatives    PAST MEDICAL & SURGICAL HISTORY:  H/O: HTN (hypertension)  H/O hyperlipidemia  CKD (chronic kidney disease)  History of appendectomy        Patient is a 82 yr old male with PMH HTN, HLD, CKD on HD via LUE AVF, BPH, hx TEVAR with Dr. Corral 8/15, found to have enlarging aorta prompting admission for repeat TEVAR.      14 system review was unremarkable    Vital signs, hemodynamic and respiratory parameters were reviewed from the bedside nursing flowsheet.  ICU Vital Signs Last 24 Hrs  T(C): 37.4 (30 Jul 2022 17:57), Max: 37.7 (29 Jul 2022 22:42)  T(F): 99.4 (30 Jul 2022 17:57), Max: 99.8 (29 Jul 2022 22:42)  HR: 82 (30 Jul 2022 20:00) (73 - 99)  BP: --  BP(mean): --  ABP: 164/56 (30 Jul 2022 20:00) (119/78 - 182/66)  ABP(mean): 101 (30 Jul 2022 20:00) (78 - 115)  RR: 20 (30 Jul 2022 20:00) (14 - 23)  SpO2: 94% (30 Jul 2022 20:00) (94% - 99%)    O2 Parameters below as of 30 Jul 2022 21:00  Patient On (Oxygen Delivery Method): room air        Intake and output was reviewed and the fluid balance was calculated  Daily     Daily   I&O's Summary    29 Jul 2022 07:01  -  30 Jul 2022 07:00  --------------------------------------------------------  IN: 592.5 mL / OUT: 210 mL / NET: 382.5 mL    30 Jul 2022 07:01  -  30 Jul 2022 20:30  --------------------------------------------------------  IN: 875 mL / OUT: 0 mL / NET: 875 mL        All lines and drain sites were assessed  Glycemic trend was reviewed    Neuro: pleasant male, ambulatory, moving all extremities equally  HEENT: mmm  Heart: s1, s2  Lungs: clear bl  Abdomen: soft, nt/nd   Extremities: no le edema      labs  CBC Full  -  ( 30 Jul 2022 03:13 )  WBC Count : 13.30 K/uL  RBC Count : 3.05 M/uL  Hemoglobin : 9.9 g/dL  Hematocrit : 29.6 %  Platelet Count - Automated : 161 K/uL  Mean Cell Volume : 97.0 fl  Mean Cell Hemoglobin : 32.5 pg  Mean Cell Hemoglobin Concentration : 33.4 gm/dL  Auto Neutrophil # : 10.76 K/uL  Auto Lymphocyte # : 1.28 K/uL  Auto Monocyte # : 1.07 K/uL  Auto Eosinophil # : 0.07 K/uL  Auto Basophil # : 0.05 K/uL  Auto Neutrophil % : 81.0 %  Auto Lymphocyte % : 9.6 %  Auto Monocyte % : 8.0 %  Auto Eosinophil % : 0.5 %  Auto Basophil % : 0.4 %    07-30    135  |  96  |  38<H>  ----------------------------<  97  4.5   |  19<L>  |  6.82<H>    Ca    8.3<L>      30 Jul 2022 03:13  Phos  5.8     07-30  Mg     1.9     07-30    TPro  6.1  /  Alb  3.6  /  TBili  0.2  /  DBili  x   /  AST  11  /  ALT  <5<L>  /  AlkPhos  91  07-30    PT/INR - ( 30 Jul 2022 03:13 )   PT: 13.8 sec;   INR: 1.16          PTT - ( 30 Jul 2022 03:13 )  PTT:29.1 sec  The current medications were reviewed   MEDICATIONS  (STANDING):  amLODIPine   Tablet 5 milliGRAM(s) Oral daily  atorvastatin 80 milliGRAM(s) Oral at bedtime  chlorhexidine 2% Cloths 1 Application(s) Topical <User Schedule>  dextrose 50% Injectable 50 milliLiter(s) IV Push every 15 minutes  dextrose 50% Injectable 25 milliLiter(s) IV Push every 15 minutes  epoetin zari-epbx (RETACRIT) Injectable 5000 Unit(s) IV Push once  insulin regular Infusion 1 Unit(s)/Hr (1 mL/Hr) IV Continuous <Continuous>  melatonin 5 milliGRAM(s) Oral at bedtime  metoprolol tartrate 12.5 milliGRAM(s) Oral every 6 hours  niCARdipine Infusion 2 mG/Hr (10 mL/Hr) IV Continuous <Continuous>  pantoprazole   Suspension 40 milliGRAM(s) Oral before breakfast  sodium chloride 0.9%. 1000 milliLiter(s) (10 mL/Hr) IV Continuous <Continuous>  tamsulosin 0.4 milliGRAM(s) Oral at bedtime    MEDICATIONS  (PRN):  acetaminophen     Tablet .. 650 milliGRAM(s) Oral every 6 hours PRN Mild Pain (1 - 3)      Assessment/Plan:  82 yr old male with PMH HTN, HLD, CKD on HD via LUE AVF, BPH, hx TEVAR with Dr. Corral 8/15, found to have enlarging aorta prompting admission for repeat TEVAR.    POD1 TEVAR (Dr. Corral) 7/29  Lumbar Drain dc today  MAP goal   ESRD on HD per renal  Serial neuro checks   Diet as tolerated  Replete lytes prn  GI/DVT PPX  Bowel Regimen  Pain control  OOB with PT  For Dc tmrw  Close hemodynamic, ventilatory and drain monitoring and management per post op routine  Monitor for arrhythmias and monitor parameters for organ perfusion  Monitor neurologic status  Head of the bed should remain elevated to 45 deg   Chest PT and IS will be encouraged  Monitor adequacy of oxygenation and ventilation and attempt to wean oxygen  Antibiotic regimen will be tailored to the clinical, laboratory and microbiologic data  Nutritional goals will be met using po eventually, ensure adequate caloric intake and monitor the same  Stress ulcer and VTE prophylaxis will be achieved    Glycemic control is satisfactory  Electrolytes have been repleted as necessary and wound care has been carried out   Pain control has been achieved.   Aggressive physical therapy and early mobility and ambulation goals will be met   The family was updated about the course and plan.    CRITICAL CARE TIME personally provided by me  in evaluation and management, reassessments, review and interpretation of labs and x-rays, ventilator and hemodynamic management, formulating a plan and coordinating care: __30____ MIN.  Time does not include procedural time.    CTICU ATTENDING     					  Zoe De Leon MD

## 2022-07-30 NOTE — PROGRESS NOTE ADULT - ASSESSMENT
82 year old male with a past medical history of hypertension, hyperlipidemia, 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 Corral for evaluation and management of distal thoracic aortic aneurysm with repeat diagnostic imaging. 5/5/22 CT C/A/P w/o contrast revealed Interval enlargement of the distal aortic arch, which now measures 5.9 cm (previously 5.7 cm) and distal descending thoracic aorta which now measures 5.7 cm (previously 5.2 cm). Patient now presents to [facility] for Lumbar Drain placement and TEVAR.       s/p TEVAR on 7/29/22  - 82 year old male with a past medical history of hypertension, hyperlipidemia, 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 Corral for evaluation and management of distal thoracic aortic aneurysm with repeat diagnostic imaging. 5/5/22 CT C/A/P w/o contrast revealed Interval enlargement of the distal aortic arch, which now measures 5.9 cm (previously 5.7 cm) and distal descending thoracic aorta which now measures 5.7 cm (previously 5.2 cm). Patient now presents to [facility] for Lumbar Drain placement and TEVAR.       s/p TEVAR on 7/29/22  -Q4h neurovascular checks   -monitor groins  -vascular will continue to follow    Remainder of care per primary team

## 2022-07-30 NOTE — PROGRESS NOTE ADULT - ASSESSMENT
81M with pmhx of ESRD via left AVF (MWF), HTN, HLD, TEVAR 8/2015 who presented to the hospital in the setting of lumbar drain placement and repeat TEVAR now s/p OR.     Assessment/Plan:   #ESRD on HD MWF  usual Rx 3h 3L   -Electrolytes at goal K of 4.5  -BP noted 150's, per team MAP of  desired  patient refusing HD today and would like to wait until his outpatient HD session on monday ; counseled patient on importance of not missing HD   dry weight TBD     #HTN   BP within CTICU goal  -Continue to monitor    #access   LUE AVF functional     #anemia  Hb within goal   obtain iron studies including ferritin, transferrin, iron, and TIBC to be able to calculate % saturation   Epo w/ HD  transfusion as per primary team     #renal bone disease   Ca ~8.5  Phos ~4.4       Thank you for the opportunity to participate in the care of your patient. The nephrology service remains available to assist with any questions or concerns. Please feel free to reach us by paging the on-call nephrology fellow for urgent issues or as below.     Abbey Reed  PGY-5, Nephrology Fellow    P: [phone]

## 2022-07-30 NOTE — PROCEDURE NOTE - GENERAL PROCEDURE DETAILS
Lumbar drain site prepped and cleaned in sterile fashion. Anchoring sutures removed. Catheter removed without resistance; tip of catheter visualized. A 3-0 vicryl suture was placed over drain exit site. Suture will absorb on its own, no need for removal. Patient tolerated procedure well.

## 2022-07-30 NOTE — PROGRESS NOTE ADULT - SUBJECTIVE AND OBJECTIVE BOX
Subjective: Patient seen and examined. No complaints. Denies abdominal pain, lower extremity pain or groin pain/tenderness.     ROS:   Denies Headache, blurred vision, Chest Pain, SOB, Abdominal pain, nausea or vomiting     Social   metoprolol tartrate 12.5  niCARdipine Infusion 2  tamsulosin 0.4      Allergies    No Known Allergies    Intolerances        Vital Signs Last 24 Hrs  T(C): 37.3 (30 Jul 2022 09:01), Max: 37.7 (29 Jul 2022 22:42)  T(F): 99.1 (30 Jul 2022 09:01), Max: 99.8 (29 Jul 2022 22:42)  HR: 75 (30 Jul 2022 14:00) (73 - 93)  BP: --  BP(mean): --  RR: 20 (30 Jul 2022 14:00) (14 - 22)  SpO2: 98% (30 Jul 2022 14:00) (95% - 100%)    Parameters below as of 30 Jul 2022 14:00  Patient On (Oxygen Delivery Method): room air      I&O's Summary    29 Jul 2022 07:01  -  30 Jul 2022 07:00  --------------------------------------------------------  IN: 592.5 mL / OUT: 210 mL / NET: 382.5 mL    30 Jul 2022 07:01  -  30 Jul 2022 15:19  --------------------------------------------------------  IN: 585 mL / OUT: 0 mL / NET: 585 mL        Physical Exam:  Gen: NAD, resting comfortably in bed  C/V: NSR  Pulm: Nonlabored breathing, no respiratory distress  Abd: soft, Non-tender, non-distended,   Groin: Bilateral groin sites soft, incisions clean dry and intact   Extrem: WWP, no calf edema, SCDs in place  Vascular: Biphasic DP + PT bilaterally, palpable femoral pulses bilaterally  Neuro: Strength symmetric, able to raise lower extremities against resistance. Proximal muscles intact.     LABS:                        9.9    13.30 )-----------( 161      ( 30 Jul 2022 03:13 )             29.6     07-30    135  |  96  |  38<H>  ----------------------------<  97  4.5   |  19<L>  |  6.82<H>    Ca    8.3<L>      30 Jul 2022 03:13  Phos  5.8     07-30  Mg     1.9     07-30    TPro  6.1  /  Alb  3.6  /  TBili  0.2  /  DBili  x   /  AST  11  /  ALT  <5<L>  /  AlkPhos  91  07-30    PT/INR - ( 30 Jul 2022 03:13 )   PT: 13.8 sec;   INR: 1.16          PTT - ( 30 Jul 2022 03:13 )  PTT:29.1 sec

## 2022-07-30 NOTE — PROGRESS NOTE ADULT - SUBJECTIVE AND OBJECTIVE BOX
CTICU  CRITICAL  CARE  attending     Hand off received 					   Pertinent clinical, laboratory, radiographic, hemodynamic, echocardiographic, respiratory data, microbiologic data and chart were reviewed and analyzed frequently throughout the course of the day and night  Patient seen and examined with CTS/ SH attending at bedside  Pt is a 82y , Male, HEALTH ISSUES - PROBLEM Dx:      , FAMILY HISTORY:  No pertinent family history in first degree relatives    PAST MEDICAL & SURGICAL HISTORY:  H/O: HTN (hypertension)      H/O hyperlipidemia      CKD (chronic kidney disease)      History of appendectomy        Patient is a 82y old  Male who presents with a chief complaint of Distal Thoracic Aortic Aneurysm (30 Jul 2022 20:29)      14 system review limited by mentation and multiorgan morbidity     Vital signs, hemodynamic and respiratory parameters were reviewed from the bedside nursing flowsheet.  ICU Vital Signs Last 24 Hrs  T(C): 37.4 (30 Jul 2022 17:57), Max: 37.7 (29 Jul 2022 22:42)  T(F): 99.4 (30 Jul 2022 17:57), Max: 99.8 (29 Jul 2022 22:42)  HR: 82 (30 Jul 2022 20:00) (73 - 99)  BP: --  BP(mean): --  ABP: 164/56 (30 Jul 2022 20:00) (119/78 - 182/66)  ABP(mean): 101 (30 Jul 2022 20:00) (78 - 115)  RR: 20 (30 Jul 2022 20:00) (14 - 23)  SpO2: 94% (30 Jul 2022 20:00) (94% - 99%)    O2 Parameters below as of 30 Jul 2022 21:00  Patient On (Oxygen Delivery Method): room air          Adult Advanced Hemodynamics Last 24 Hrs  CVP(mm Hg): 12 (30 Jul 2022 20:00) (-9 - 35)  CVP(cm H2O): --  CO: --  CI: --  PA: --  PA(mean): --  PCWP: --  SVR: --  SVRI: --  PVR: --  PVRI: --, ABG - ( 30 Jul 2022 03:14 )  pH, Arterial: 7.44  pH, Blood: x     /  pCO2: 28    /  pO2: 93    / HCO3: 19    / Base Excess: -3.8  /  SaO2: 97.9                Intake and output was reviewed and the fluid balance was calculated  Daily     Daily   I&O's Summary    29 Jul 2022 07:01  -  30 Jul 2022 07:00  --------------------------------------------------------  IN: 592.5 mL / OUT: 210 mL / NET: 382.5 mL    30 Jul 2022 07:01  -  30 Jul 2022 20:36  --------------------------------------------------------  IN: 875 mL / OUT: 0 mL / NET: 875 mL        All lines and drain sites were assessed  Glycemic trend was reviewedCAPILLARY BLOOD GLUCOSE        No acute change in focality  Auscultation of the chest reveals equal bs  Abdomen is soft  Extremities are warm and well perfused  Wounds appear clean and unremarkable  Antibiotics are periop    labs  CBC Full  -  ( 30 Jul 2022 03:13 )  WBC Count : 13.30 K/uL  RBC Count : 3.05 M/uL  Hemoglobin : 9.9 g/dL  Hematocrit : 29.6 %  Platelet Count - Automated : 161 K/uL  Mean Cell Volume : 97.0 fl  Mean Cell Hemoglobin : 32.5 pg  Mean Cell Hemoglobin Concentration : 33.4 gm/dL  Auto Neutrophil # : 10.76 K/uL  Auto Lymphocyte # : 1.28 K/uL  Auto Monocyte # : 1.07 K/uL  Auto Eosinophil # : 0.07 K/uL  Auto Basophil # : 0.05 K/uL  Auto Neutrophil % : 81.0 %  Auto Lymphocyte % : 9.6 %  Auto Monocyte % : 8.0 %  Auto Eosinophil % : 0.5 %  Auto Basophil % : 0.4 %    07-30    135  |  96  |  38<H>  ----------------------------<  97  4.5   |  19<L>  |  6.82<H>    Ca    8.3<L>      30 Jul 2022 03:13  Phos  5.8     07-30  Mg     1.9     07-30    TPro  6.1  /  Alb  3.6  /  TBili  0.2  /  DBili  x   /  AST  11  /  ALT  <5<L>  /  AlkPhos  91  07-30    PT/INR - ( 30 Jul 2022 03:13 )   PT: 13.8 sec;   INR: 1.16          PTT - ( 30 Jul 2022 03:13 )  PTT:29.1 sec  The current medications were reviewed   MEDICATIONS  (STANDING):  amLODIPine   Tablet 5 milliGRAM(s) Oral daily  atorvastatin 80 milliGRAM(s) Oral at bedtime  chlorhexidine 2% Cloths 1 Application(s) Topical <User Schedule>  dextrose 50% Injectable 50 milliLiter(s) IV Push every 15 minutes  dextrose 50% Injectable 25 milliLiter(s) IV Push every 15 minutes  epoetin zari-epbx (RETACRIT) Injectable 5000 Unit(s) IV Push once  insulin regular Infusion 1 Unit(s)/Hr (1 mL/Hr) IV Continuous <Continuous>  melatonin 5 milliGRAM(s) Oral at bedtime  metoprolol tartrate 12.5 milliGRAM(s) Oral every 6 hours  niCARdipine Infusion 2 mG/Hr (10 mL/Hr) IV Continuous <Continuous>  pantoprazole   Suspension 40 milliGRAM(s) Oral before breakfast  sodium chloride 0.9%. 1000 milliLiter(s) (10 mL/Hr) IV Continuous <Continuous>  tamsulosin 0.4 milliGRAM(s) Oral at bedtime    MEDICATIONS  (PRN):  acetaminophen     Tablet .. 650 milliGRAM(s) Oral every 6 hours PRN Mild Pain (1 - 3)       PROBLEM LIST/ ASSESSMENT:  HEALTH ISSUES - PROBLEM Dx:      ,   Patient is a 82y old  Male who presents with a chief complaint of Distal Thoracic Aortic Aneurysm (30 Jul 2022 20:29)     s/p cardiac surgery                My plan includes :  close hemodynamic, ventilatory and drain monitoring and management per post op routine    Monitor for arrhythmias and monitor parameters for organ perfusion  beta blockade not administered due to hemodynamic instability and bradycardia  monitor neurologic status  Head of the bed should remain elevated to 45 deg .   chest PT and IS will be encouraged  monitor adequacy of oxygenation and ventilation and attempt to wean oxygen  antibiotic regimen will be tailored to the clinical, laboratory and microbiologic data  Nutritional goals will be met using po eventually , ensure adequate caloric intake and montior the same  Stress ulcer and VTE prophylaxis will be achieved    Glycemic control is satisfactory  Electrolytes have been repleted as necessary and wound care has been carried out. Pain control has been achieved.   charleyssive physical therapy and early mobility and ambulation goals will be met   The family was updated about the course and plan  CRITICAL CARE TIME personally provided by me  in evaluation and management, reassessments, review and interpretation of labs and x-rays, ventilator and hemodynamic management, formulating a plan and coordinating care: ___90____ MIN.  Time does not include procedural time. Time spent was non routine post-operarive caRE and included multiple and repeated evaluations at the bedside  CTICU ATTENDING     					    Zak Hernandez MD

## 2022-07-30 NOTE — PROGRESS NOTE ADULT - SUBJECTIVE AND OBJECTIVE BOX
Patient is a 82y Male seen and evaluated at bedside. no acute events overnight patient adamantly refusing HD today as he had a very horrible experience thursday night and would like to wait until his regular outpatient session on monday as he is being discharged tomorrow K 4.5 bicarb 19 hgb 9.9 ; counseled patient on importance of needing HD today given electrolyte derrangements however still adamantly refusing      Meds:    acetaminophen     Tablet .. 650 every 6 hours PRN  atorvastatin 80 at bedtime  chlorhexidine 2% Cloths 1 <User Schedule>  dextrose 50% Injectable 50 every 15 minutes  dextrose 50% Injectable 25 every 15 minutes  epoetin zari-epbx (RETACRIT) Injectable 5000 once  insulin regular Infusion 1 <Continuous>  melatonin 5 at bedtime  meperidine     Injectable 25 once  niCARdipine Infusion 2 <Continuous>  pantoprazole   Suspension 40 before breakfast  sodium chloride 0.9%. 1000 <Continuous>  tamsulosin 0.4 at bedtime      T(C): , Max: 37.7 (07-29-22 @ 22:42)  T(F): , Max: 99.8 (07-29-22 @ 22:42)  HR: 86 (07-30-22 @ 09:00)  BP: --  BP(mean): --  RR: 21 (07-30-22 @ 09:00)  SpO2: 99% (07-30-22 @ 09:00)  Wt(kg): --    07-29 @ 07:01  -  07-30 @ 07:00  --------------------------------------------------------  IN: 592.5 mL / OUT: 210 mL / NET: 382.5 mL    07-30 @ 07:01  -  07-30 @ 10:12  --------------------------------------------------------  IN: 32.5 mL / OUT: 0 mL / NET: 32.5 mL          Review of Systems:  all other ROS negative       PHYSICAL EXAM:  GENERAL: NAD resting comfortably in bed  CHEST/LUNG: CTA BL   HEART: normal S1S2, RRR  ABDOMEN: Soft, Nontender, +BS, No flank tenderness bilateral  EXTREMITIES: No clubbing, cyanosis, or edema   ACCESS: good thrill and bruit appreciated      LABS:                        9.9    13.30 )-----------( 161      ( 30 Jul 2022 03:13 )             29.6     07-30    135  |  96  |  38<H>  ----------------------------<  97  4.5   |  19<L>  |  6.82<H>    Ca    8.3<L>      30 Jul 2022 03:13  Phos  5.8     07-30  Mg     1.9     07-30    TPro  6.1  /  Alb  3.6  /  TBili  0.2  /  DBili  x   /  AST  11  /  ALT  <5<L>  /  AlkPhos  91  07-30      PT/INR - ( 30 Jul 2022 03:13 )   PT: 13.8 sec;   INR: 1.16          PTT - ( 30 Jul 2022 03:13 )  PTT:29.1 sec          RADIOLOGY & ADDITIONAL STUDIES:

## 2022-07-31 ENCOUNTER — TRANSCRIPTION ENCOUNTER (OUTPATIENT)
Age: 82
End: 2022-07-31

## 2022-07-31 VITALS — RESPIRATION RATE: 18 BRPM | HEART RATE: 95 BPM | OXYGEN SATURATION: 98 %

## 2022-07-31 LAB
ALBUMIN SERPL ELPH-MCNC: 3.5 G/DL — SIGNIFICANT CHANGE UP (ref 3.3–5)
ALP SERPL-CCNC: 81 U/L — SIGNIFICANT CHANGE UP (ref 40–120)
ALT FLD-CCNC: <5 U/L — LOW (ref 10–45)
ANION GAP SERPL CALC-SCNC: 18 MMOL/L — HIGH (ref 5–17)
APTT BLD: 27.5 SEC — SIGNIFICANT CHANGE UP (ref 27.5–35.5)
AST SERPL-CCNC: 14 U/L — SIGNIFICANT CHANGE UP (ref 10–40)
BASOPHILS # BLD AUTO: 0.04 K/UL — SIGNIFICANT CHANGE UP (ref 0–0.2)
BASOPHILS NFR BLD AUTO: 0.3 % — SIGNIFICANT CHANGE UP (ref 0–2)
BILIRUB SERPL-MCNC: 0.2 MG/DL — SIGNIFICANT CHANGE UP (ref 0.2–1.2)
BUN SERPL-MCNC: 46 MG/DL — HIGH (ref 7–23)
CALCIUM SERPL-MCNC: 7.8 MG/DL — LOW (ref 8.4–10.5)
CHLORIDE SERPL-SCNC: 96 MMOL/L — SIGNIFICANT CHANGE UP (ref 96–108)
CO2 SERPL-SCNC: 21 MMOL/L — LOW (ref 22–31)
CREAT SERPL-MCNC: 8.71 MG/DL — HIGH (ref 0.5–1.3)
EGFR: 6 ML/MIN/1.73M2 — LOW
EOSINOPHIL # BLD AUTO: 0.18 K/UL — SIGNIFICANT CHANGE UP (ref 0–0.5)
EOSINOPHIL NFR BLD AUTO: 1.4 % — SIGNIFICANT CHANGE UP (ref 0–6)
GAS PNL BLDA: SIGNIFICANT CHANGE UP
GLUCOSE SERPL-MCNC: 102 MG/DL — HIGH (ref 70–99)
HCT VFR BLD CALC: 27.7 % — LOW (ref 39–50)
HGB BLD-MCNC: 9.3 G/DL — LOW (ref 13–17)
IMM GRANULOCYTES NFR BLD AUTO: 0.4 % — SIGNIFICANT CHANGE UP (ref 0–1.5)
INR BLD: 1.22 — HIGH (ref 0.88–1.16)
LYMPHOCYTES # BLD AUTO: 0.99 K/UL — LOW (ref 1–3.3)
LYMPHOCYTES # BLD AUTO: 7.9 % — LOW (ref 13–44)
MAGNESIUM SERPL-MCNC: 1.9 MG/DL — SIGNIFICANT CHANGE UP (ref 1.6–2.6)
MCHC RBC-ENTMCNC: 32.9 PG — SIGNIFICANT CHANGE UP (ref 27–34)
MCHC RBC-ENTMCNC: 33.6 GM/DL — SIGNIFICANT CHANGE UP (ref 32–36)
MCV RBC AUTO: 97.9 FL — SIGNIFICANT CHANGE UP (ref 80–100)
MONOCYTES # BLD AUTO: 1.17 K/UL — HIGH (ref 0–0.9)
MONOCYTES NFR BLD AUTO: 9.4 % — SIGNIFICANT CHANGE UP (ref 2–14)
NEUTROPHILS # BLD AUTO: 10.06 K/UL — HIGH (ref 1.8–7.4)
NEUTROPHILS NFR BLD AUTO: 80.6 % — HIGH (ref 43–77)
NRBC # BLD: 0 /100 WBCS — SIGNIFICANT CHANGE UP (ref 0–0)
PHOSPHATE SERPL-MCNC: 5.5 MG/DL — HIGH (ref 2.5–4.5)
PLATELET # BLD AUTO: 147 K/UL — LOW (ref 150–400)
POTASSIUM SERPL-MCNC: 4.2 MMOL/L — SIGNIFICANT CHANGE UP (ref 3.5–5.3)
POTASSIUM SERPL-SCNC: 4.2 MMOL/L — SIGNIFICANT CHANGE UP (ref 3.5–5.3)
PROT SERPL-MCNC: 5.9 G/DL — LOW (ref 6–8.3)
PROTHROM AB SERPL-ACNC: 14.5 SEC — HIGH (ref 10.5–13.4)
RBC # BLD: 2.83 M/UL — LOW (ref 4.2–5.8)
RBC # FLD: 14.6 % — HIGH (ref 10.3–14.5)
SODIUM SERPL-SCNC: 135 MMOL/L — SIGNIFICANT CHANGE UP (ref 135–145)
WBC # BLD: 12.49 K/UL — HIGH (ref 3.8–10.5)
WBC # FLD AUTO: 12.49 K/UL — HIGH (ref 3.8–10.5)

## 2022-07-31 PROCEDURE — 85027 COMPLETE CBC AUTOMATED: CPT

## 2022-07-31 PROCEDURE — 86850 RBC ANTIBODY SCREEN: CPT

## 2022-07-31 PROCEDURE — 85610 PROTHROMBIN TIME: CPT

## 2022-07-31 PROCEDURE — 80053 COMPREHEN METABOLIC PANEL: CPT

## 2022-07-31 PROCEDURE — C1874: CPT

## 2022-07-31 PROCEDURE — 86901 BLOOD TYPING SEROLOGIC RH(D): CPT

## 2022-07-31 PROCEDURE — 83735 ASSAY OF MAGNESIUM: CPT

## 2022-07-31 PROCEDURE — C1753: CPT

## 2022-07-31 PROCEDURE — 84100 ASSAY OF PHOSPHORUS: CPT

## 2022-07-31 PROCEDURE — 71045 X-RAY EXAM CHEST 1 VIEW: CPT

## 2022-07-31 PROCEDURE — 76000 FLUOROSCOPY <1 HR PHYS/QHP: CPT

## 2022-07-31 PROCEDURE — 84295 ASSAY OF SERUM SODIUM: CPT

## 2022-07-31 PROCEDURE — 86923 COMPATIBILITY TEST ELECTRIC: CPT

## 2022-07-31 PROCEDURE — 85025 COMPLETE CBC W/AUTO DIFF WBC: CPT

## 2022-07-31 PROCEDURE — 87340 HEPATITIS B SURFACE AG IA: CPT

## 2022-07-31 PROCEDURE — 90935 HEMODIALYSIS ONE EVALUATION: CPT

## 2022-07-31 PROCEDURE — 85014 HEMATOCRIT: CPT

## 2022-07-31 PROCEDURE — 82803 BLOOD GASES ANY COMBINATION: CPT

## 2022-07-31 PROCEDURE — 93880 EXTRACRANIAL BILAT STUDY: CPT

## 2022-07-31 PROCEDURE — C1894: CPT

## 2022-07-31 PROCEDURE — 83880 ASSAY OF NATRIURETIC PEPTIDE: CPT

## 2022-07-31 PROCEDURE — C9399: CPT

## 2022-07-31 PROCEDURE — 36415 COLL VENOUS BLD VENIPUNCTURE: CPT

## 2022-07-31 PROCEDURE — 85347 COAGULATION TIME ACTIVATED: CPT

## 2022-07-31 PROCEDURE — C1751: CPT

## 2022-07-31 PROCEDURE — C1760: CPT

## 2022-07-31 PROCEDURE — 86803 HEPATITIS C AB TEST: CPT

## 2022-07-31 PROCEDURE — 82330 ASSAY OF CALCIUM: CPT

## 2022-07-31 PROCEDURE — 86900 BLOOD TYPING SEROLOGIC ABO: CPT

## 2022-07-31 PROCEDURE — 86706 HEP B SURFACE ANTIBODY: CPT

## 2022-07-31 PROCEDURE — 93005 ELECTROCARDIOGRAM TRACING: CPT

## 2022-07-31 PROCEDURE — 80061 LIPID PANEL: CPT

## 2022-07-31 PROCEDURE — 84443 ASSAY THYROID STIM HORMONE: CPT

## 2022-07-31 PROCEDURE — C1769: CPT

## 2022-07-31 PROCEDURE — 82947 ASSAY GLUCOSE BLOOD QUANT: CPT

## 2022-07-31 PROCEDURE — 94150 VITAL CAPACITY TEST: CPT

## 2022-07-31 PROCEDURE — C1887: CPT

## 2022-07-31 PROCEDURE — 84132 ASSAY OF SERUM POTASSIUM: CPT

## 2022-07-31 PROCEDURE — 99233 SBSQ HOSP IP/OBS HIGH 50: CPT

## 2022-07-31 PROCEDURE — 83036 HEMOGLOBIN GLYCOSYLATED A1C: CPT

## 2022-07-31 PROCEDURE — 85730 THROMBOPLASTIN TIME PARTIAL: CPT

## 2022-07-31 RX ORDER — CALCIUM ACETATE 667 MG
1 TABLET ORAL
Qty: 0 | Refills: 0 | DISCHARGE

## 2022-07-31 RX ADMIN — CHLORHEXIDINE GLUCONATE 1 APPLICATION(S): 213 SOLUTION TOPICAL at 05:06

## 2022-07-31 RX ADMIN — PANTOPRAZOLE SODIUM 40 MILLIGRAM(S): 20 TABLET, DELAYED RELEASE ORAL at 05:02

## 2022-07-31 RX ADMIN — Medication 12.5 MILLIGRAM(S): at 00:17

## 2022-07-31 RX ADMIN — AMLODIPINE BESYLATE 5 MILLIGRAM(S): 2.5 TABLET ORAL at 05:02

## 2022-07-31 RX ADMIN — Medication 12.5 MILLIGRAM(S): at 12:27

## 2022-07-31 RX ADMIN — Medication 12.5 MILLIGRAM(S): at 05:02

## 2022-07-31 NOTE — DISCHARGE NOTE PROVIDER - NSDCFUADDINST_GEN_ALL_CORE_FT
-Walk daily as tolerated and use your incentive spirometer every hour.    -No driving or strenuous activity/exercise for 6 weeks, or until cleared by your surgeon.    -Gently clean your incisions with anti-bacterial soap and water, pat dry.  You may leave them open to air.    -Call your doctor if you have shortness of breath, chest pain not relieved by pain medication, dizziness, fever >101.5, or increased redness or drainage from incisions.  -Walk daily as tolerated and use your incentive spirometer every hour.    -No driving or strenuous activity/exercise until cleared by your surgeon.    -Gently clean your incisions with anti-bacterial soap and water, pat dry.  You may leave them open to air.    -Call your doctor if you have shortness of breath, chest pain not relieved by pain medication, dizziness, fever >101.5, or increased redness or drainage from incisions.

## 2022-07-31 NOTE — PROGRESS NOTE ADULT - PROVIDER SPECIALTY LIST ADULT
Critical Care
Critical Care
Vascular Surgery
Vascular Surgery
Critical Care
Nephrology
CT Surgery
Nephrology
Vascular Surgery
Nephrology

## 2022-07-31 NOTE — DISCHARGE NOTE NURSING/CASE MANAGEMENT/SOCIAL WORK - NSDCPEFALRISK_GEN_ALL_CORE
For information on Fall & Injury Prevention, visit: [email or web address] OR  [email or web address] OR  https://www.cdc.gov/steadi/patient.html

## 2022-07-31 NOTE — PROGRESS NOTE ADULT - REASON FOR ADMISSION
Distal Thoracic Aortic Aneurysm

## 2022-07-31 NOTE — PROGRESS NOTE ADULT - SUBJECTIVE AND OBJECTIVE BOX
Patient is a 82y Male seen and evaluated at bedside. no aucte events overnight patient states he is feeling well and pending DC home this afternoon hgb 9.3 K 4.2 bicarb 21       Meds:    acetaminophen     Tablet .. 650 every 6 hours PRN  amLODIPine   Tablet 5 daily  atorvastatin 80 at bedtime  chlorhexidine 2% Cloths 1 <User Schedule>  dextrose 50% Injectable 50 every 15 minutes  dextrose 50% Injectable 25 every 15 minutes  epoetin zari-epbx (RETACRIT) Injectable 5000 once  insulin regular Infusion 1 <Continuous>  melatonin 5 at bedtime  metoprolol tartrate 12.5 every 6 hours  niCARdipine Infusion 2 <Continuous>  pantoprazole   Suspension 40 before breakfast  sodium chloride 0.9%. 1000 <Continuous>  tamsulosin 0.4 at bedtime      T(C): , Max: 37.4 (07-30-22 @ 15:27)  T(F): , Max: 99.4 (07-30-22 @ 15:27)  HR: 88 (07-31-22 @ 08:00)  BP: --  BP(mean): --  RR: 16 (07-31-22 @ 08:00)  SpO2: 97% (07-31-22 @ 08:00)  Wt(kg): --    07-30 @ 07:01  -  07-31 @ 07:00  --------------------------------------------------------  IN: 1135 mL / OUT: 200 mL / NET: 935 mL    07-31 @ 07:01  -  07-31 @ 08:58  --------------------------------------------------------  IN: 20 mL / OUT: 0 mL / NET: 20 mL          Review of Systems:  all other ROS negative     PHYSICAL EXAM:  GENERAL: NAD resting comfortably in chair   CHEST/LUNG: CTA BL no rales rhonchi or wheezing  HEART: normal S1S2, RRR  ABDOMEN: Soft, Nontender, +BS,   EXTREMITIES: No clubbing, cyanosis, or edema   [facility]: [facility]      LABS:                        9.3    12.49 )-----------( 147      ( 31 Jul 2022 03:20 )             27.7     07-31    135  |  96  |  46<H>  ----------------------------<  102<H>  4.2   |  21<L>  |  8.71<H>    Ca    7.8<L>      31 Jul 2022 03:20  Phos  5.5     07-31  Mg     1.9     07-31    TPro  5.9<L>  /  Alb  3.5  /  TBili  0.2  /  DBili  x   /  AST  14  /  ALT  <5<L>  /  AlkPhos  81  07-31      PT/INR - ( 31 Jul 2022 03:20 )   PT: 14.5 sec;   INR: 1.22          PTT - ( 31 Jul 2022 03:20 )  PTT:27.5 sec          RADIOLOGY & ADDITIONAL STUDIES:

## 2022-07-31 NOTE — PROGRESS NOTE ADULT - ASSESSMENT
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 Corral for evaluation and management of distal thoracic aortic aneurysm with repeat diagnostic imaging. 5/5/22 CT C/A/P w/o contrast revealed Interval enlargement of the distal aortic arch, which now measures 5.9 cm (previously 5.7 cm) and distal descending thoracic aorta which now measures 5.7 cm (previously 5.2 cm). Patient admitted to [facility] on 7/28/22 for Lumbar Drain placement. On 7/29 he underwent TEVAR.  He tolerated the procedure well and transferred to the CTICU in stable condition. His neuro exam remained intact and he ambulated well. His lumbar drain was removed on POD #1. His antihypertensives were slowly resumed. He was discharged home on POD #2.     Plan for home today. He will follow up with Dr. Corral in one month

## 2022-07-31 NOTE — DISCHARGE NOTE NURSING/CASE MANAGEMENT/SOCIAL WORK - PATIENT PORTAL LINK FT
You can access the FollowMyHealth Patient Portal offered by [facility] by registering at the following website: [email or web address]. By joining [employer]’s FollowMyHealth portal, you will also be able to view your health information using other applications (apps) compatible with our system.

## 2022-07-31 NOTE — DISCHARGE NOTE PROVIDER - CARE PROVIDER_API CALL
Marlo Corral (MD)  Surgery; Thoracic and Cardiac Surgery  [address] [place] [address]  Phone: [phone]  Fax: [phone]  Follow Up Time:

## 2022-07-31 NOTE — PROGRESS NOTE ADULT - SUBJECTIVE AND OBJECTIVE BOX
Subjective: PT was sitting in chair, with family at bedside.     ROS:   Denies Headache, blurred vision, Chest Pain, SOB, Abdominal pain, nausea or vomiting     Social   amLODIPine   Tablet 5  metoprolol tartrate 12.5  niCARdipine Infusion 2  tamsulosin 0.4      Allergies    No Known Allergies    Intolerances        Vital Signs Last 24 Hrs  T(C): 37.2 (31 Jul 2022 09:39), Max: 37.4 (30 Jul 2022 15:27)  T(F): 98.9 (31 Jul 2022 09:39), Max: 99.4 (30 Jul 2022 15:27)  HR: 95 (31 Jul 2022 12:00) (67 - 99)  BP: --  BP(mean): --  RR: 18 (31 Jul 2022 12:00) (14 - 24)  SpO2: 98% (31 Jul 2022 12:00) (93% - 100%)    Parameters below as of 31 Jul 2022 13:00  Patient On (Oxygen Delivery Method): room air      I&O's Summary    30 Jul 2022 07:01  -  31 Jul 2022 07:00  --------------------------------------------------------  IN: 1135 mL / OUT: 200 mL / NET: 935 mL    31 Jul 2022 07:01  -  31 Jul 2022 12:22  --------------------------------------------------------  IN: 140 mL / OUT: 0 mL / NET: 140 mL        Physical Exam:  Gen: NAD  C/V: NSR  Pulm: Nonlabored breathing, no respiratory distress  Abd: soft, Non-tender, non-distended,   Groin: Bilateral groin sites soft, incisions clean dry and intact   Extrem: WWP, no calf edema, SCDs in place  Vascular: Biphasic DP + PT bilaterally, palpable femoral pulses bilaterally  Neuro: Strength symmetric, able to raise lower extremities against resistance. Proximal muscles intact.     LABS:                        9.3    12.49 )-----------( 147      ( 31 Jul 2022 03:20 )             27.7     07-31    135  |  96  |  46<H>  ----------------------------<  102<H>  4.2   |  21<L>  |  8.71<H>    Ca    7.8<L>      31 Jul 2022 03:20  Phos  5.5     07-31  Mg     1.9     07-31    TPro  5.9<L>  /  Alb  3.5  /  TBili  0.2  /  DBili  x   /  AST  14  /  ALT  <5<L>  /  AlkPhos  81  07-31    PT/INR - ( 31 Jul 2022 03:20 )   PT: 14.5 sec;   INR: 1.22          PTT - ( 31 Jul 2022 03:20 )  PTT:27.5 sec    Radiology and Additional Studies:    · Assessment	  82 year old male with a past medical history of hypertension, hyperlipidemia, 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 Corral for evaluation and management of distal thoracic aortic aneurysm with repeat diagnostic imaging. 5/5/22 CT C/A/P w/o contrast revealed Interval enlargement of the distal aortic arch, which now measures 5.9 cm (previously 5.7 cm) and distal descending thoracic aorta which now measures 5.7 cm (previously 5.2 cm). Patient now presents to [facility] for Lumbar Drain placement and TEVAR.       s/p TEVAR on 7/29/22  -Q4h neurovascular checks   -monitor groins  -upon discharge please make an appointment to see Dr. Simental outpatient follow up in 1-2 weeks. office #: [phone]    Remainder of care per primary team

## 2022-07-31 NOTE — PROGRESS NOTE ADULT - SUBJECTIVE AND OBJECTIVE BOX
Patient discussed on morning rounds with Dr. Corral    Operation / Date: TEVAR/ 7/29    Surgeon: Ellis    Referring Physician: Major    SUBJECTIVE ASSESSMENT:  82y Male doing well, no acute distress    Hospital Course:    81 year old male with a past medical history of hypertension, hyperlipidemia, CKD stage 4 on HD via left AVF, renal cysts, BPH, s/p thoracic endovascular aortic repair (TEVAR)  8/20/15 presented for a routine one year follow up visit with Dr. Corral for evaluation and management of distal thoracic aortic aneurysm with repeat diagnostic imaging. 5/5/22 CT C/A/P w/o contrast revealed Interval enlargement of the distal aortic arch, which now measures 5.9 cm (previously 5.7 cm) and distal descending thoracic aorta which now measures 5.7 cm (previously 5.2 cm). Patient admitted to [facility] on 7/28/22 for Lumbar Drain placement. On 7/29 he underwent TEVAR.  He tolerated the procedure well and transferred to the CTICU in stable condition. His neuro exam remained intact and he ambulated well. His lumbar drain was removed on POD #1. His antihypertensives were slowly resumed. He was discharged home on POD #2.     Vital Signs Last 24 Hrs  T(C): 37.2 (31 Jul 2022 09:39), Max: 37.4 (30 Jul 2022 15:27)  T(F): 98.9 (31 Jul 2022 09:39), Max: 99.4 (30 Jul 2022 15:27)  HR: 95 (31 Jul 2022 12:00) (67 - 99) SR  BP(mean): --  RR: 18 (31 Jul 2022 12:00) (14 - 24)  SpO2: 98% (31 Jul 2022 12:00) (93% - 100%) RA    Parameters below as of 31 Jul 2022 13:00  Patient On (Oxygen Delivery Method): room air      I&O's Detail    30 Jul 2022 07:01  -  31 Jul 2022 07:00  --------------------------------------------------------  IN:    NiCARdipine: 25 mL    Oral Fluid: 870 mL    sodium chloride 0.9%: 230 mL    Sodium Chloride 0.9% Bolus: 10 mL  Total IN: 1135 mL    OUT:    Voided (mL): 200 mL  Total OUT: 200 mL    Total NET: 935 mL      31 Jul 2022 07:01  -  31 Jul 2022 13:01  --------------------------------------------------------  IN:    Oral Fluid: 120 mL    sodium chloride 0.9%: 20 mL  Total IN: 140 mL    OUT:  Total OUT: 0 mL    Total NET: 140 mL        PHYSICAL EXAM:    General: NAD     Neurological: A&O, non focal    Cardiovascular: RRR, no m/r/g    Respiratory: CTABL    Gastrointestinal: + BS, soft, non tender    Extremities: warm, no edema    Vascular: pedal pulses palpable    Incision Sites: Groins soft.     LABS:                        9.3    12.49 )-----------( 147      ( 31 Jul 2022 03:20 )             27.7       COUMADIN:  Yes/No.        DOSE:                  INDICATION:                GOAL INR:    PT/INR - ( 31 Jul 2022 03:20 )   PT: 14.5 sec;   INR: 1.22          PTT - ( 31 Jul 2022 03:20 )  PTT:27.5 sec    07-31    135  |  96  |  46<H>  ----------------------------<  102<H>  4.2   |  21<L>  |  8.71<H>    Ca    7.8<L>      31 Jul 2022 03:20  Phos  5.5     07-31  Mg     1.9     07-31    TPro  5.9<L>  /  Alb  3.5  /  TBili  0.2  /  DBili  x   /  AST  14  /  ALT  <5<L>  /  AlkPhos  81  07-31          MEDICATIONS  (STANDING):  amLODIPine   Tablet 5 milliGRAM(s) Oral daily  atorvastatin 80 milliGRAM(s) Oral at bedtime  chlorhexidine 2% Cloths 1 Application(s) Topical <User Schedule>  dextrose 50% Injectable 50 milliLiter(s) IV Push every 15 minutes  dextrose 50% Injectable 25 milliLiter(s) IV Push every 15 minutes  epoetin zari-epbx (RETACRIT) Injectable 5000 Unit(s) IV Push once  insulin regular Infusion 1 Unit(s)/Hr (1 mL/Hr) IV Continuous <Continuous>  melatonin 5 milliGRAM(s) Oral at bedtime  metoprolol tartrate 12.5 milliGRAM(s) Oral every 6 hours  niCARdipine Infusion 2 mG/Hr (10 mL/Hr) IV Continuous <Continuous>  pantoprazole   Suspension 40 milliGRAM(s) Oral before breakfast  sodium chloride 0.9%. 1000 milliLiter(s) (10 mL/Hr) IV Continuous <Continuous>  tamsulosin 0.4 milliGRAM(s) Oral at bedtime

## 2022-07-31 NOTE — DISCHARGE NOTE NURSING/CASE MANAGEMENT/SOCIAL WORK - NSDCFUADDAPPT_GEN_ALL_CORE_FT
-Please follow up with Dr. Corral in [facility] in one month, please call the office for an appointment. Call us with any questions #[phone].

## 2022-07-31 NOTE — DISCHARGE NOTE PROVIDER - NSDCMRMEDTOKEN_GEN_ALL_CORE_FT
amLODIPine 10 mg oral tablet: 1 tab(s) orally once a day  Aspirin Enteric Coated 81 mg oral delayed release tablet: 1 tab(s) orally once a day  Bystolic 20 mg oral tablet: 1 tab(s) orally once a day  calcitriol 0.25 mcg oral capsule: 1 cap(s) orally once a day  cloNIDine 0.1 mg oral tablet: 1 tab(s) orally once a day, As Needed for sleeping  Crestor 20 mg oral tablet: 1 tab(s) orally once a day (at bedtime)  esomeprazole 40 mg oral delayed release capsule: 1 cap(s) orally once a day  Livalo 4 mg oral tablet: 1 tab(s) orally once a day  Lokelma 10 g oral powder for reconstitution: 10 gram(s) orally once a day  Plavix 75 mg oral tablet: 1 tab(s) orally once a day  tamsulosin 0.4 mg oral capsule: 1 cap(s) orally once a day  Vitamin D3 1000 intl units oral tablet: 1 tab(s) orally once a day   amLODIPine 10 mg oral tablet: 1 tab(s) orally once a day  Aspirin Enteric Coated 81 mg oral delayed release tablet: 1 tab(s) orally once a day  Bystolic 20 mg oral tablet: 1 tab(s) orally once a day  calcitriol 0.25 mcg oral capsule: 1 cap(s) orally once a day  calcium acetate 667 mg oral tablet: 1 tab(s) orally once a day  cloNIDine 0.1 mg oral tablet: 1 tab(s) orally once a day, As Needed for sleeping  esomeprazole 40 mg oral delayed release capsule: 1 cap(s) orally once a day  Livalo 4 mg oral tablet: 1 tab(s) orally once a day  Lokelma 10 g oral powder for reconstitution: 10 gram(s) orally once a day  tamsulosin 0.4 mg oral capsule: 1 cap(s) orally once a day  Vitamin D3 1000 intl units oral tablet: 1 tab(s) orally once a day

## 2022-07-31 NOTE — DISCHARGE NOTE PROVIDER - HOSPITAL COURSE
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 Corral for evaluation and management of distal thoracic aortic aneurysm with repeat diagnostic imaging. 5/5/22 CT C/A/P w/o contrast revealed Interval enlargement of the distal aortic arch, which now measures 5.9 cm (previously 5.7 cm) and distal descending thoracic aorta which now measures 5.7 cm (previously 5.2 cm). Patient now presents to [facility] for presurgical workup for Lumbar Drain placement and TEVAR.     Home Medications:   * Patient Currently Takes Medications as of 28-Jul-2022 18:47 documented in Structured Notes  · 	Bystolic 20 mg oral tablet: Last Dose Taken:  , 1 tab(s) orally once a day  · 	tamsulosin 0.4 mg oral capsule: Last Dose Taken:  , 1 cap(s) orally once a day  · 	amLODIPine 10 mg oral tablet: Last Dose Taken:  , 1 tab(s) orally once a day  · 	Livalo 4 mg oral tablet: Last Dose Taken:  , 1 tab(s) orally once a day  · 	cloNIDine 0.1 mg oral tablet: Last Dose Taken:  , 1 tab(s) orally once a day, As Needed for sleeping  · 	Lokelma 10 g oral powder for reconstitution: Last Dose Taken:  , 10 gram(s) orally once a day  · 	calcitriol 0.25 mcg oral capsule: Last Dose Taken:  , 1 cap(s) orally once a day  · 	esomeprazole 40 mg oral delayed release capsule: Last Dose Taken:  , 1 cap(s) orally once a day  · 	Plavix 75 mg oral tablet: Last Dose Taken:  , 1 tab(s) orally once a day  · 	Crestor 20 mg oral tablet: Last Dose Taken:  , 1 tab(s) orally once a day (at bedtime)  · 	Aspirin Enteric Coated 81 mg oral delayed release tablet: Last Dose Taken:  , 1 tab(s) orally once a day  · 	Vitamin D3 1000 intl units oral tablet: Last Dose Taken:  , 1 tab(s) orally once a day     81 year old male     7/29: TEVAR    a past medical history of hypertension, hyperlipidemia, 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 Corral for evaluation and management of distal thoracic aortic aneurysm with repeat diagnostic imaging. 5/5/22 CT C/A/P w/o contrast revealed Interval enlargement of the distal aortic arch, which now measures 5.9 cm (previously 5.7 cm) and distal descending thoracic aorta which now measures 5.7 cm (previously 5.2 cm). Patient now presents to [facility] for presurgical workup for Lumbar Drain placement and TEVAR.        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 Corral for evaluation and management of distal thoracic aortic aneurysm with repeat diagnostic imaging. 5/5/22 CT C/A/P w/o contrast revealed Interval enlargement of the distal aortic arch, which now measures 5.9 cm (previously 5.7 cm) and distal descending thoracic aorta which now measures 5.7 cm (previously 5.2 cm). Patient admitted to [facility] on 7/28/22 for Lumbar Drain placement. On 7/29 he underwent TEVAR.  He tolerated the procedure well and transferred to the CTICU in stable condition. His neuro exam remained intact and he ambulated well. His lumbar drain was removed on POD #1. His antihypertensives were slowly resumed. He was discharged home on POD #2.    35 minutes was spent with the patient reviewing the discharge material including medications, follow up appointments, recovery, concerning symptoms, and how to contact their health care providers if they have questions   81 year old male with a past medical history of hypertension, hyperlipidemia, CKD stage 4 on HD via left AVF, renal cysts, BPH, s/p thoracic endovascular aortic repair (TEVAR)  8/20/15 presented for a routine one year follow up visit with Dr. Corral for evaluation and management of distal thoracic aortic aneurysm with repeat diagnostic imaging. 5/5/22 CT C/A/P w/o contrast revealed Interval enlargement of the distal aortic arch, which now measures 5.9 cm (previously 5.7 cm) and distal descending thoracic aorta which now measures 5.7 cm (previously 5.2 cm). Patient admitted to [facility] on 7/28/22 for Lumbar Drain placement. On 7/29 he underwent TEVAR.  He tolerated the procedure well and transferred to the CTICU in stable condition. His neuro exam remained intact and he ambulated well. His lumbar drain was removed on POD #1. His antihypertensives were slowly resumed. He was discharged home on POD #2.    35 minutes was spent with the patient reviewing the discharge material including medications, follow up appointments, recovery, concerning symptoms, and how to contact their health care providers if they have questions      Of note: patient is resuming usual home medications and states he has enough refills and is not requesting more refills at this time.

## 2022-07-31 NOTE — PROGRESS NOTE ADULT - ASSESSMENT
81M with pmhx of ESRD via left AVF (MWF), HTN, HLD, TEVAR 8/2015 who presented to the hospital in the setting of lumbar drain placement and repeat TEVAR now s/p OR.     Assessment/Plan:   #ESRD on HD MWF  usual Rx 3h 3L   -Electrolytes at goal K of 4.2  -BP noted 150's, per team MAP of  desired  next HD 8/1 if patient still remains in hospital   dry weight TBD     #HTN   BP within CTICU goal  -Continue to monitor    #access   LUE AVF functional     #anemia  Hb within goal   obtain iron studies including ferritin, transferrin, iron, and TIBC to be able to calculate % saturation   Epo w/ HD  transfusion as per primary team     #renal bone disease   Ca ~8.5  Phos ~4.4       Thank you for the opportunity to participate in the care of your patient. The nephrology service remains available to assist with any questions or concerns. Please feel free to reach us by paging the on-call nephrology fellow for urgent issues or as below.     Abbey Reed  PGY-5, Nephrology Fellow    P: [phone]

## 2022-07-31 NOTE — PROGRESS NOTE ADULT - SUBJECTIVE AND OBJECTIVE BOX
CRITICAL CARE ATTENDING - CTICU    MEDICATIONS  (STANDING):  amLODIPine   Tablet 5 milliGRAM(s) Oral daily  atorvastatin 80 milliGRAM(s) Oral at bedtime  chlorhexidine 2% Cloths 1 Application(s) Topical <User Schedule>  dextrose 50% Injectable 50 milliLiter(s) IV Push every 15 minutes  dextrose 50% Injectable 25 milliLiter(s) IV Push every 15 minutes  epoetin zari-epbx (RETACRIT) Injectable 5000 Unit(s) IV Push once  insulin regular Infusion 1 Unit(s)/Hr (1 mL/Hr) IV Continuous <Continuous>  melatonin 5 milliGRAM(s) Oral at bedtime  metoprolol tartrate 12.5 milliGRAM(s) Oral every 6 hours  niCARdipine Infusion 2 mG/Hr (10 mL/Hr) IV Continuous <Continuous>  pantoprazole   Suspension 40 milliGRAM(s) Oral before breakfast  sodium chloride 0.9%. 1000 milliLiter(s) (10 mL/Hr) IV Continuous <Continuous>  tamsulosin 0.4 milliGRAM(s) Oral at bedtime                                    9.3    12.49 )-----------( 147      ( 31 Jul 2022 03:20 )             27.7       07-31    135  |  96  |  46<H>  ----------------------------<  102<H>  4.2   |  21<L>  |  8.71<H>    Ca    7.8<L>      31 Jul 2022 03:20  Phos  5.5     07-31  Mg     1.9     07-31    TPro  5.9<L>  /  Alb  3.5  /  TBili  0.2  /  DBili  x   /  AST  14  /  ALT  <5<L>  /  AlkPhos  81  07-31      PT/INR - ( 31 Jul 2022 03:20 )   PT: 14.5 sec;   INR: 1.22          PTT - ( 31 Jul 2022 03:20 )  PTT:27.5 sec        Daily     Daily       07-30 @ 07:01  -  07-31 @ 07:00  --------------------------------------------------------  IN: 1135 mL / OUT: 200 mL / NET: 935 mL    07-31 @ 07:01  - 07-31 @ 12:03  --------------------------------------------------------  IN: 140 mL / OUT: 0 mL / NET: 140 mL        Critically Ill patient  : [ ] preoperative ,   [x ] post operative    Requires :  [ ] Arterial Line   [ ] Central Line  [ ] PA catheter  [ ] IABP  [ ] ECMO  [ ] LVAD  [ ] Ventilator  [ ] pacemaker [ ] Impella.                      [x ] ABG's     [x ] Pulse Oxymetry Monitoring  Bedside evaluation , monitoring , treatment of hemodynamics , fluids , IVP/ IVCD meds.        Diagnosis:     POD TEVAR     s/p Lumbar Drain - removed     Hypertension     Thrombocytopenia     Chronic Renal Failure - End Stage Renal Disease     Aortic Aneurysm     Requires bedside physical therapy, mobilization and total prison care.                       Discussed with CT surgeon, Physician's Assistant - Nurse Practitioner- Critical care medicine team.   Discussed at  AM / PM rounds.   Chart, labs , films reviewed.    Cumulative Critical Care Time Given Today : 20 min

## 2022-07-31 NOTE — DISCHARGE NOTE PROVIDER - NSDCFUADDAPPT_GEN_ALL_CORE_FT
-[place] [facility], please call the office for an appointment. Call us with any questions #[phone]. -Please follow up with Dr. Corral in [facility] [place] in one month, please call the office for an appointment. Call us with any questions #[phone].    -Continue your usual hemodialysis on Monday 8/1/22

## 2022-08-01 LAB
ISTAT ACTK (ACTIVATED CLOTTING TIME KAOLIN): 126 SEC — SIGNIFICANT CHANGE UP (ref 74–137)
ISTAT ACTK (ACTIVATED CLOTTING TIME KAOLIN): 132 SEC — SIGNIFICANT CHANGE UP (ref 74–137)
ISTAT ACTK (ACTIVATED CLOTTING TIME KAOLIN): 213 SEC — HIGH (ref 74–137)
ISTAT ARTERIAL BE: 6 MMOL/L — HIGH (ref -2–3)
ISTAT ARTERIAL BE: 7 MMOL/L — HIGH (ref -2–3)
ISTAT ARTERIAL GLUCOSE: 77 MG/DL — SIGNIFICANT CHANGE UP (ref 70–99)
ISTAT ARTERIAL GLUCOSE: 82 MG/DL — SIGNIFICANT CHANGE UP (ref 70–99)
ISTAT ARTERIAL HCO3: 30 MMOL/L — HIGH (ref 22–26)
ISTAT ARTERIAL HCO3: 31 MMOL/L — HIGH (ref 22–26)
ISTAT ARTERIAL HEMATOCRIT: 30 % — LOW (ref 39–50)
ISTAT ARTERIAL HEMATOCRIT: 31 % — LOW (ref 39–50)
ISTAT ARTERIAL HEMOGLOBIN: 10.2 G/DL — LOW (ref 13–17)
ISTAT ARTERIAL HEMOGLOBIN: 10.5 G/DL — LOW (ref 13–17)
ISTAT ARTERIAL IONIZED CALCIUM: 1.04 MMOL/L — LOW (ref 1.12–1.3)
ISTAT ARTERIAL IONIZED CALCIUM: 1.07 MMOL/L — LOW (ref 1.12–1.3)
ISTAT ARTERIAL PCO2: 36 MMHG — SIGNIFICANT CHANGE UP (ref 35–45)
ISTAT ARTERIAL PCO2: 42 MMHG — SIGNIFICANT CHANGE UP (ref 35–45)
ISTAT ARTERIAL PH: 7.47 — HIGH (ref 7.35–7.45)
ISTAT ARTERIAL PH: 7.53 — HIGH (ref 7.35–7.45)
ISTAT ARTERIAL PO2: 467 MMHG — HIGH (ref 80–105)
ISTAT ARTERIAL PO2: 524 MMHG — HIGH (ref 80–105)
ISTAT ARTERIAL POTASSIUM: 4.2 MMOL/L — SIGNIFICANT CHANGE UP (ref 3.5–5.3)
ISTAT ARTERIAL POTASSIUM: 4.3 MMOL/L — SIGNIFICANT CHANGE UP (ref 3.5–5.3)
ISTAT ARTERIAL SO2: 100 % — HIGH (ref 95–98)
ISTAT ARTERIAL SO2: 100 % — HIGH (ref 95–98)
ISTAT ARTERIAL SODIUM: 136 MMOL/L — SIGNIFICANT CHANGE UP (ref 135–145)
ISTAT ARTERIAL SODIUM: 136 MMOL/L — SIGNIFICANT CHANGE UP (ref 135–145)
ISTAT ARTERIAL TCO2: 31 MMOL/L — SIGNIFICANT CHANGE UP (ref 22–31)
ISTAT ARTERIAL TCO2: 32 MMOL/L — HIGH (ref 22–31)

## 2022-08-02 ENCOUNTER — NON-APPOINTMENT (OUTPATIENT)
Age: 82
End: 2022-08-02

## 2022-08-02 DIAGNOSIS — D69.6 THROMBOCYTOPENIA, UNSPECIFIED: ICD-10-CM

## 2022-08-02 DIAGNOSIS — Z79.02 LONG TERM (CURRENT) USE OF ANTITHROMBOTICS/ANTIPLATELETS: ICD-10-CM

## 2022-08-02 DIAGNOSIS — I71.2 THORACIC AORTIC ANEURYSM, WITHOUT RUPTURE: ICD-10-CM

## 2022-08-02 DIAGNOSIS — N40.0 BENIGN PROSTATIC HYPERPLASIA WITHOUT LOWER URINARY TRACT SYMPTOMS: ICD-10-CM

## 2022-08-02 DIAGNOSIS — F12.90 CANNABIS USE, UNSPECIFIED, UNCOMPLICATED: ICD-10-CM

## 2022-08-02 DIAGNOSIS — E78.5 HYPERLIPIDEMIA, UNSPECIFIED: ICD-10-CM

## 2022-08-02 DIAGNOSIS — Z79.82 LONG TERM (CURRENT) USE OF ASPIRIN: ICD-10-CM

## 2022-08-02 DIAGNOSIS — Z99.2 DEPENDENCE ON RENAL DIALYSIS: ICD-10-CM

## 2022-08-02 DIAGNOSIS — N25.0 RENAL OSTEODYSTROPHY: ICD-10-CM

## 2022-08-02 DIAGNOSIS — N28.1 CYST OF KIDNEY, ACQUIRED: ICD-10-CM

## 2022-08-02 DIAGNOSIS — K21.9 GASTRO-ESOPHAGEAL REFLUX DISEASE WITHOUT ESOPHAGITIS: ICD-10-CM

## 2022-08-02 DIAGNOSIS — N18.6 END STAGE RENAL DISEASE: ICD-10-CM

## 2022-08-02 DIAGNOSIS — I12.0 HYPERTENSIVE CHRONIC KIDNEY DISEASE WITH STAGE 5 CHRONIC KIDNEY DISEASE OR END STAGE RENAL DISEASE: ICD-10-CM

## 2022-08-02 DIAGNOSIS — Z87.891 PERSONAL HISTORY OF NICOTINE DEPENDENCE: ICD-10-CM

## 2022-08-02 PROBLEM — N18.9 CHRONIC KIDNEY DISEASE, UNSPECIFIED: Chronic | Status: ACTIVE | Noted: 2022-07-28

## 2022-08-04 ENCOUNTER — OUTPATIENT (OUTPATIENT)
Dept: OUTPATIENT SERVICES | Facility: HOSPITAL | Age: 82
LOS: 1 days | End: 2022-08-04
Payer: MEDICARE

## 2022-08-04 ENCOUNTER — APPOINTMENT (OUTPATIENT)
Dept: CT IMAGING | Facility: IMAGING CENTER | Age: 82
End: 2022-08-04

## 2022-08-04 DIAGNOSIS — Z95.828 PRESENCE OF OTHER VASCULAR IMPLANTS AND GRAFTS: ICD-10-CM

## 2022-08-04 DIAGNOSIS — Z98.89 OTHER SPECIFIED POSTPROCEDURAL STATES: Chronic | ICD-10-CM

## 2022-08-04 DIAGNOSIS — Z09 ENCOUNTER FOR FOLLOW-UP EXAMINATION AFTER COMPLETED TREATMENT FOR CONDITIONS OTHER THAN MALIGNANT NEOPLASM: ICD-10-CM

## 2022-08-04 DIAGNOSIS — Z98.890 OTHER SPECIFIED POSTPROCEDURAL STATES: ICD-10-CM

## 2022-08-04 PROCEDURE — 74176 CT ABD & PELVIS W/O CONTRAST: CPT | Mod: 26,MH

## 2022-08-04 PROCEDURE — 71250 CT THORAX DX C-: CPT

## 2022-08-04 PROCEDURE — 74176 CT ABD & PELVIS W/O CONTRAST: CPT

## 2022-08-04 PROCEDURE — 71250 CT THORAX DX C-: CPT | Mod: 26,MH

## 2022-08-10 ENCOUNTER — APPOINTMENT (OUTPATIENT)
Dept: CARDIOTHORACIC SURGERY | Facility: CLINIC | Age: 82
End: 2022-08-10

## 2022-08-10 VITALS
DIASTOLIC BLOOD PRESSURE: 57 MMHG | TEMPERATURE: 98.2 F | HEIGHT: 67 IN | WEIGHT: 128 LBS | OXYGEN SATURATION: 98 % | RESPIRATION RATE: 16 BRPM | BODY MASS INDEX: 20.09 KG/M2 | SYSTOLIC BLOOD PRESSURE: 143 MMHG | HEART RATE: 73 BPM

## 2022-08-10 PROCEDURE — 99024 POSTOP FOLLOW-UP VISIT: CPT

## 2022-08-12 RX ORDER — TERAZOSIN 1 MG/1
1 CAPSULE ORAL
Qty: 90 | Refills: 3 | Status: ACTIVE | COMMUNITY
Start: 2022-08-12

## 2022-10-17 ENCOUNTER — NON-APPOINTMENT (OUTPATIENT)
Age: 82
End: 2022-10-17

## 2023-02-01 ENCOUNTER — NON-APPOINTMENT (OUTPATIENT)
Age: 83
End: 2023-02-01

## 2023-07-13 ENCOUNTER — OUTPATIENT (OUTPATIENT)
Dept: OUTPATIENT SERVICES | Facility: HOSPITAL | Age: 83
LOS: 1 days | Discharge: HOME | End: 2023-07-13

## 2023-07-13 ENCOUNTER — APPOINTMENT (OUTPATIENT)
Dept: HEMATOLOGY ONCOLOGY | Facility: CLINIC | Age: 83
End: 2023-07-13

## 2023-07-13 ENCOUNTER — OUTPATIENT (OUTPATIENT)
Dept: OUTPATIENT SERVICES | Facility: HOSPITAL | Age: 83
LOS: 1 days | End: 2023-07-13
Payer: MEDICARE

## 2023-07-13 DIAGNOSIS — D64.9 ANEMIA, UNSPECIFIED: ICD-10-CM

## 2023-07-13 DIAGNOSIS — Z98.89 OTHER SPECIFIED POSTPROCEDURAL STATES: Chronic | ICD-10-CM

## 2023-07-13 PROCEDURE — 86850 RBC ANTIBODY SCREEN: CPT

## 2023-07-13 PROCEDURE — 86923 COMPATIBILITY TEST ELECTRIC: CPT

## 2023-07-13 PROCEDURE — 86901 BLOOD TYPING SEROLOGIC RH(D): CPT

## 2023-07-13 PROCEDURE — 86900 BLOOD TYPING SEROLOGIC ABO: CPT

## 2023-07-15 ENCOUNTER — APPOINTMENT (OUTPATIENT)
Dept: INFUSION THERAPY | Facility: HOSPITAL | Age: 83
End: 2023-07-15

## 2023-07-18 DIAGNOSIS — R11.2 NAUSEA WITH VOMITING, UNSPECIFIED: ICD-10-CM

## 2023-07-18 DIAGNOSIS — Z51.89 ENCOUNTER FOR OTHER SPECIFIED AFTERCARE: ICD-10-CM

## 2023-08-05 ENCOUNTER — OUTPATIENT (OUTPATIENT)
Dept: OUTPATIENT SERVICES | Facility: HOSPITAL | Age: 83
LOS: 1 days | End: 2023-08-05
Payer: MEDICARE

## 2023-08-05 ENCOUNTER — APPOINTMENT (OUTPATIENT)
Dept: CT IMAGING | Facility: CLINIC | Age: 83
End: 2023-08-05
Payer: MEDICARE

## 2023-08-05 DIAGNOSIS — Z98.89 OTHER SPECIFIED POSTPROCEDURAL STATES: Chronic | ICD-10-CM

## 2023-08-05 DIAGNOSIS — Z95.828 PRESENCE OF OTHER VASCULAR IMPLANTS AND GRAFTS: ICD-10-CM

## 2023-08-05 PROCEDURE — 74176 CT ABD & PELVIS W/O CONTRAST: CPT | Mod: 26,MH

## 2023-08-05 PROCEDURE — 71250 CT THORAX DX C-: CPT

## 2023-08-05 PROCEDURE — 74176 CT ABD & PELVIS W/O CONTRAST: CPT

## 2023-08-05 PROCEDURE — 71250 CT THORAX DX C-: CPT | Mod: 26,MH

## 2023-08-09 ENCOUNTER — APPOINTMENT (OUTPATIENT)
Dept: CARDIOTHORACIC SURGERY | Facility: CLINIC | Age: 83
End: 2023-08-09
Payer: MEDICARE

## 2023-08-09 VITALS
HEIGHT: 67 IN | HEART RATE: 92 BPM | WEIGHT: 120 LBS | TEMPERATURE: 97.8 F | OXYGEN SATURATION: 97 % | RESPIRATION RATE: 16 BRPM | BODY MASS INDEX: 18.83 KG/M2 | DIASTOLIC BLOOD PRESSURE: 85 MMHG | SYSTOLIC BLOOD PRESSURE: 181 MMHG

## 2023-08-09 PROCEDURE — 99214 OFFICE O/P EST MOD 30 MIN: CPT

## 2023-08-14 NOTE — END OF VISIT
[FreeTextEntry3] :  I, ANNABEL Molina , personally performed the evaluation and management (E/M) services for this established  patient. That E/M includes conducting the initial examination, assessing all conditions, and establishing the plan of care. Today, ALFRED MIRAMONTES  was here to observe my evaluation and management services for this patient.  I personally performed the services described in the documentation, reviewed the documentation recorded by the scribe in my presence and it accurately and completely records my words and actions.

## 2023-08-14 NOTE — CONSULT LETTER
[Dear  ___] : Dear  [unfilled], [FreeTextEntry2] : Dr.Jay Grijalva,  [address] [place] [address]  [FreeTextEntry1] : Mr. MARIE   was seen today for a post operative visit. She is doing well status post  status post TEVAR on 7/29/22. We have asked that the patient followup in your office. We have instructed the patient to return to see us in 6 months with Non cont CT CAP  . Enclosed is a copy of the operative report. Please contact our office for any questions or concerns at [phone].  Thank you for allowing us to participate in this patients care.

## 2023-08-14 NOTE — DATA REVIEWED
[FreeTextEntry1] : 8/5/23 Non cont CT CAP revealed Status post thoracic endovascular stent graft repair of the aorta. Aortic measurements are unchanged since August 4, 2022.  Evaluation for stent patency, endoleak, or dissection cannot be performed in the absence of intravenous contrast.

## 2023-08-14 NOTE — HISTORY OF PRESENT ILLNESS
[FreeTextEntry1] : 83 year old male with a past medical history of hypertension, hyperlipidemia, CKD stage 4 on HD via left AVF, renal cysts, BPH, s/p thoracic endovascular aortic repair (TEVAR) 8/20/15 and TEVAR on 7/29/22, presents for a one year follow up visit with repeat diagnostic imaging.  Patient is doing well and denies recent hospitalization, ER visits, or surgeries. He denies fever, chills, fatigue, headache, blurred vision, dizziness, syncope, chest pain, palpitations, shortness of breath, orthopnea, paroxysmal nocturnal dyspnea, nausea, vomiting, abdominal pain, back pain, headache, visual disturbances, CVA, PE, DVT, D/C, hematochezia, melena, dysuria, hematuria,  BRBPR or swelling to legs.    8/5/23 Non cont CT CAP revealed Status post thoracic endovascular stent graft repair of the aorta. Aortic measurements are unchanged since August 4, 2022.  Evaluation for stent patency, endoleak, or dissection cannot be performed in the absence of intravenous contrast.

## 2023-08-14 NOTE — ASSESSMENT
[FreeTextEntry1] : 83 year old male with a past medical history of hypertension, hyperlipidemia, CKD stage 4 on HD via left AVF, renal cysts, BPH, s/p thoracic endovascular aortic repair (TEVAR) 8/20/15 and TEVAR on 7/29/22, presents for a one year follow up visit with repeat diagnostic imaging.   I have reviewed the patient's medical records, diagnostic images during the time of this office consultation and have made the following recommendation. The surgical repair is intact and stable.    Plan  1. Follow up in [facility]  with Non cont C/A/P . 2. Continue medication regimen. 3. Follow up with cardiologist and PCP. 4. BP control- I have recommended the patient to monitor his blood pressure closely. I have also advised the patient to take daily blood pressures at home and adhere to medication regimen. 5. Discussed signs and symptoms that warrant emergency medical attention

## 2024-01-16 NOTE — DISCHARGE NOTE PROVIDER - NSDCQMPCI_CARD_ALL_CORE
No solid consumption: 2100                        Date of last liquid consumption: 01/16/24                        Date of last solid food consumption: 01/14/24    BMI:   Wt Readings from Last 3 Encounters:   01/16/24 120.2 kg (265 lb)   01/03/24 122 kg (269 lb)   10/25/23 120.4 kg (265 lb 6.4 oz)     Body mass index is 39.13 kg/m².    CBC:   Lab Results   Component Value Date/Time    WBC 10.9 10/25/2023 12:10 PM    RBC 4.79 10/25/2023 12:10 PM    HGB 15.0 10/25/2023 12:10 PM    HCT 45.3 10/25/2023 12:10 PM    MCV 94.4 10/25/2023 12:10 PM    RDW 14.0 10/25/2023 12:10 PM     10/25/2023 12:10 PM       CMP:   Lab Results   Component Value Date/Time     10/25/2023 12:10 PM    K 4.2 10/25/2023 12:10 PM    K 3.7 12/21/2019 07:53 AM     10/25/2023 12:10 PM    CO2 24 10/25/2023 12:10 PM    BUN 18 10/25/2023 12:10 PM    CREATININE 0.8 10/25/2023 12:10 PM    GFRAA >60 04/12/2022 09:06 AM    GFRAA >60 02/21/2012 10:15 AM    AGRATIO 2.0 10/25/2023 12:10 PM    LABGLOM >60 10/25/2023 12:10 PM    GLUCOSE 96 10/25/2023 12:10 PM    PROT 6.2 10/25/2023 12:10 PM    PROT 7.1 03/01/2013 02:18 PM    CALCIUM 9.4 10/25/2023 12:10 PM    BILITOT 0.6 10/25/2023 12:10 PM    ALKPHOS 93 10/25/2023 12:10 PM    AST 27 10/25/2023 12:10 PM    ALT 37 10/25/2023 12:10 PM       POC Tests: No results for input(s): \"POCGLU\", \"POCNA\", \"POCK\", \"POCCL\", \"POCBUN\", \"POCHEMO\", \"POCHCT\" in the last 72 hours.    Coags:   Lab Results   Component Value Date/Time    PROTIME 14.9 12/30/2019 11:12 AM    INR 1.28 12/30/2019 11:12 AM    APTT 26.8 12/12/2019 12:21 PM       HCG (If Applicable): No results found for: \"PREGTESTUR\", \"PREGSERUM\", \"HCG\", \"HCGQUANT\"     ABGs: No results found for: \"PHART\", \"PO2ART\", \"ENW9TOZ\", \"HSF4NPS\", \"BEART\", \"R2XCITMI\"     Type & Screen (If Applicable):  Lab Results   Component Value Date    LABABO B 02/17/2012    LABRH Positive 02/17/2012       Drug/Infectious Status (If Applicable):  No results found for: \"HIV\",

## 2024-02-09 PROBLEM — Z95.828 S/P INSERTION OF ENDOVASCULAR THORACIC AORTIC STENT GRAFT: Status: ACTIVE | Noted: 2021-03-17

## 2024-02-09 PROBLEM — Z98.890 S/P THORACIC AORTIC ANEURYSM REPAIR: Status: ACTIVE | Noted: 2020-09-09

## 2024-02-10 ENCOUNTER — APPOINTMENT (OUTPATIENT)
Dept: CT IMAGING | Facility: IMAGING CENTER | Age: 84
End: 2024-02-10
Payer: MEDICARE

## 2024-02-10 ENCOUNTER — OUTPATIENT (OUTPATIENT)
Dept: OUTPATIENT SERVICES | Facility: HOSPITAL | Age: 84
LOS: 1 days | End: 2024-02-10
Payer: MEDICARE

## 2024-02-10 DIAGNOSIS — Z98.89 OTHER SPECIFIED POSTPROCEDURAL STATES: Chronic | ICD-10-CM

## 2024-02-10 DIAGNOSIS — Z98.890 OTHER SPECIFIED POSTPROCEDURAL STATES: ICD-10-CM

## 2024-02-10 DIAGNOSIS — Z95.828 PRESENCE OF OTHER VASCULAR IMPLANTS AND GRAFTS: ICD-10-CM

## 2024-02-10 PROCEDURE — 71250 CT THORAX DX C-: CPT | Mod: 26,MH

## 2024-02-10 PROCEDURE — 74176 CT ABD & PELVIS W/O CONTRAST: CPT

## 2024-02-10 PROCEDURE — 71250 CT THORAX DX C-: CPT

## 2024-02-10 PROCEDURE — 74176 CT ABD & PELVIS W/O CONTRAST: CPT | Mod: 26,MH

## 2024-02-14 ENCOUNTER — APPOINTMENT (OUTPATIENT)
Dept: CARDIOTHORACIC SURGERY | Facility: CLINIC | Age: 84
End: 2024-02-14
Payer: MEDICARE

## 2024-02-14 VITALS
SYSTOLIC BLOOD PRESSURE: 193 MMHG | RESPIRATION RATE: 16 BRPM | HEIGHT: 67 IN | OXYGEN SATURATION: 99 % | HEART RATE: 58 BPM | BODY MASS INDEX: 18.69 KG/M2 | TEMPERATURE: 97.5 F | DIASTOLIC BLOOD PRESSURE: 87 MMHG | WEIGHT: 119.05 LBS

## 2024-02-14 DIAGNOSIS — Z98.890 OTHER SPECIFIED POSTPROCEDURAL STATES: ICD-10-CM

## 2024-02-14 DIAGNOSIS — I77.810 THORACIC AORTIC ECTASIA: ICD-10-CM

## 2024-02-14 DIAGNOSIS — I10 ESSENTIAL (PRIMARY) HYPERTENSION: ICD-10-CM

## 2024-02-14 DIAGNOSIS — Z86.79 OTHER SPECIFIED POSTPROCEDURAL STATES: ICD-10-CM

## 2024-02-14 DIAGNOSIS — Z95.828 PRESENCE OF OTHER VASCULAR IMPLANTS AND GRAFTS: ICD-10-CM

## 2024-02-14 DIAGNOSIS — N40.0 BENIGN PROSTATIC HYPERPLASIA WITHOUT LOWER URINARY TRACT SYMPMS: ICD-10-CM

## 2024-02-14 PROCEDURE — 99214 OFFICE O/P EST MOD 30 MIN: CPT

## 2024-02-15 NOTE — ASSESSMENT
[FreeTextEntry1] : 83 year old male with a past medical history of hypertension, hyperlipidemia, CKD stage 4 on HD via left AVF, renal cysts, BPH, s/p thoracic endovascular aortic repair (TEVAR) 8/20/15 and TEVAR on 7/29/22, presents for a one year follow up visit with repeat diagnostic imaging.   I have reviewed the patient's medical records, diagnostic images during the time of this office consultation and have made the following recommendation. The surgical repair is intact and stable.    Plan  1. Follow up in [facility] in one year with Non cont C/A/P . 2. Continue medication regimen. 3. Follow up with cardiologist and PCP. 4. BP control- I have recommended the patient to monitor his blood pressure closely. I have also advised the patient to take daily blood pressures at home and adhere to medication regimen. 5. Discussed signs and symptoms that warrant emergency medical attention

## 2024-02-15 NOTE — HISTORY OF PRESENT ILLNESS
[FreeTextEntry1] : 83 year old male with a past medical history of hypertension, hyperlipidemia, CKD stage 4 on HD via left AVF, renal cysts, BPH, s/p thoracic endovascular aortic repair (TEVAR) 8/20/15 and TEVAR on 7/29/22, presents for a 6 month follow up visit with repeat diagnostic imaging.  Patient is doing well and denies recent hospitalization, ER visits, or surgeries. He denies fever, chills, fatigue, headache, blurred vision, dizziness, syncope, chest pain, palpitations, shortness of breath, orthopnea, paroxysmal nocturnal dyspnea, nausea, vomiting, abdominal pain, back pain, headache, visual disturbances, CVA, PE, DVT, D/C, hematochezia, melena, dysuria, hematuria,  BRBPR or swelling to legs.     2/10/24 Non cont CAP revealed Stable position of the thoracic aortic stent. Aortic measurements are stable since 8/5/2023. Evaluation of the stent patency, endoleak, or dissection is limited without IV contrast.

## 2024-02-15 NOTE — END OF VISIT
[FreeTextEntry3] :  I, ANNABEL Molina , personally performed the evaluation and management (E/M) services for this established  patient. That E/M includes conducting the initial examination, assessing all conditions, and establishing the plan of care. Today, ALFRED MIRAMONTES  was here to observe my evaluation and management services for this patient.

## 2024-02-15 NOTE — DATA REVIEWED
[FreeTextEntry1] :  2/10/24 Non cont CAP revealed Stable position of the thoracic aortic stent. Aortic measurements are stable since 8/5/2023. Evaluation of the stent patency, endoleak, or dissection is limited without IV contrast.  8/5/23 Non cont CT CAP revealed Status post thoracic endovascular stent graft repair of the aorta. Aortic measurements are unchanged since August 4, 2022.  Evaluation for stent patency, endoleak, or dissection cannot be performed in the absence of intravenous contrast.

## 2024-02-15 NOTE — PROCEDURE
[FreeTextEntry1] :  Dr. Corral reviewed the indications for surgery, and used our webpage [email or web address].org <[email or web address].heartprocedures.org> to illustrate the aorta and anatomy of the heart. Those indications are the following: size greater than 5.0 cm, symptomatic aneurysms, family history of aortic dissection or aneurysm death with a size greater than 4.5 cm, other necessary cardiac procedures such as coronary artery bypass grafting or valvular disorders with an aneurysm greater than 4.5 cm, or connective tissue disorders with an aneurysm size greater than 4.5 cm. The patient does not meet size criteria for surgical intervention at the time. Patient was advised to view the educational video prior to this visit regarding aortic pathology, risk factors, surgical procedures, and lifestyle modifications. Video can be retrieved at <[email or web address].[email or web address]  Dr. Corral discussed activity restrictions with the patient, and would advise exercise at a moderate amount with no heavy lifting over one third of body weight, and avoiding heart rates that exceed 140 beats per minute. In addition, every patient should abstain from tobacco abuse and to avoid all illicit drug use, especially stimulants such as cocaine or methamphetamine. Dr. Corral also counseled regarding maintaining a healthy heart diet, and losing any excessive weight as this also put undue stress on both the aorta and entire cardiovascular system. First degree family members should be screened for bicuspid valve disease, and ascending aortic aneurysms.

## 2024-02-15 NOTE — CONSULT LETTER
[Dear  ___] : Dear  [unfilled], [FreeTextEntry2] : Dr.Jay Grijalva,  [address] [place] [address]  [FreeTextEntry1] : Mr. MARIE   was seen today for a post operative visit. She is doing well status post  status post TEVAR on 7/29/22. We have asked that the patient followup in your office. We have instructed the patient to return to see us in one year  with Non cont CT CAP  . Enclosed is a copy of the operative report. Please contact our office for any questions or concerns at [phone].  Thank you for allowing us to participate in this patients care.

## 2024-02-15 NOTE — PHYSICAL EXAM
[Sclera] : the sclera and conjunctiva were normal [PERRL With Normal Accommodation] : pupils were equal in size, round, and reactive to light [Neck Appearance] : the appearance of the neck was normal [] : no respiratory distress [Respiration, Rhythm And Depth] : normal respiratory rhythm and effort [Auscultation Breath Sounds / Voice Sounds] : lungs were clear to auscultation bilaterally [Apical Impulse] : the apical impulse was normal [Heart Rate And Rhythm] : heart rate was normal and rhythm regular [Heart Sounds] : normal S1 and S2 [Murmurs] : no murmurs [Examination Of The Chest] : the chest was normal in appearance [2+] : left 2+ [Breast Appearance] : normal in appearance [Bowel Sounds] : normal bowel sounds [Abdomen Soft] : soft [No CVA Tenderness] : no ~M costovertebral angle tenderness [Abnormal Walk] : normal gait [Involuntary Movements] : no involuntary movements were seen [Skin Color & Pigmentation] : normal skin color and pigmentation [No Focal Deficits] : no focal deficits [Skin Turgor] : normal skin turgor [Oriented To Time, Place, And Person] : oriented to person, place, and time [Impaired Insight] : insight and judgment were intact [Affect] : the affect was normal [Mood] : the mood was normal [Memory Recent] : recent memory was not impaired [Memory Remote] : remote memory was not impaired [FreeTextEntry1] : Deferred

## 2024-04-05 ENCOUNTER — APPOINTMENT (OUTPATIENT)
Dept: UROLOGY | Facility: CLINIC | Age: 84
End: 2024-04-05
Payer: MEDICARE

## 2024-04-05 VITALS — HEART RATE: 73 BPM | SYSTOLIC BLOOD PRESSURE: 153 MMHG | DIASTOLIC BLOOD PRESSURE: 75 MMHG

## 2024-04-05 DIAGNOSIS — N41.1 CHRONIC PROSTATITIS: ICD-10-CM

## 2024-04-05 PROCEDURE — 99204 OFFICE O/P NEW MOD 45 MIN: CPT

## 2024-04-05 RX ORDER — DOXYCYCLINE HYCLATE 100 MG/1
100 CAPSULE ORAL
Qty: 14 | Refills: 0 | Status: ACTIVE | COMMUNITY
Start: 2024-04-05 | End: 1900-01-01

## 2024-04-06 NOTE — ASSESSMENT
[FreeTextEntry1] : Patient is a 83 yo M h/o multiple medical comorbidities including ESRD on HD who presents for pain with ejaculation.  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  D/w pt pain w ejaculation is not well characterized.  His pain is very short lived and self resolves. D/w pt that need to rule out infection - he only voids 1x/day a scant amount.  He will attempt to collect sample at home D/w pt if any evidence of infection will notify and he can consider taking abx.   Given age would hold off on psa Otherwise d/w pt that pelvic relaxation/sitz baths F/u prn if pain does not resolve

## 2024-04-06 NOTE — PHYSICAL EXAM
[Normal Appearance] : normal appearance [Well Groomed] : well groomed [Exaggerated Use Of Accessory Muscles For Inspiration] : no accessory muscle use [No Focal Deficits] : no focal deficits [Oriented To Time, Place, And Person] : oriented to person, place, and time [Abdomen Soft] : soft [Abdomen Hernia] : no hernia was discovered [Abdomen Tenderness] : non-tender [Urethral Meatus] : meatus normal [Urinary Bladder Findings] : the bladder was normal on palpation [Scrotum] : the scrotum was normal [Testes Tenderness] : no tenderness of the testes [Testes Mass (___cm)] : there were no testicular masses [Prostate Tenderness] : the prostate was not tender [No Prostate Nodules] : no prostate nodules [Prostate Size ___ (0-4)] : prostate size [unfilled] (scale: 0-4) [Normal Station and Gait] : the gait and station were normal for the patient's age [] : no rash [de-identified] : Chaperone for exam was offered but declined by pt.  Normal phallus.  Prostate nonboggy/nontender.  Prostatic massage attempted but no fluid expressed

## 2024-04-06 NOTE — HISTORY OF PRESENT ILLNESS
[FreeTextEntry1] : Pt is 83 yo male with primary medical history of HTN, AAA thoracic aorta, renal cysts, and enlarged prostate, ESRD since Feb 2022 who presents for penile pain after organism.   He started to notice severe penile pain after organism for the past 6-7 months, which lasts for 3-4 seconds and subsides subsequently, otherwise at baseline he does not have penile pain, no curvature or scrotal pain. He took viagra in the past with poor response and currently denied any take PDE5 inhibitors or injections for erection.  No voiding complaints. No lesions or discharge. No previous pelvic trauma.  He is on hemodialysis since Feb, 2022, still makes little urine in the morning. No dysuria or hematuria when he voids. He is states he is not actively with penetration, but rather oral sex and will have pain w ejaculation.

## 2024-06-25 ENCOUNTER — APPOINTMENT (OUTPATIENT)
Dept: UROLOGY | Facility: CLINIC | Age: 84
End: 2024-06-25
Payer: MEDICARE

## 2024-06-25 VITALS — DIASTOLIC BLOOD PRESSURE: 69 MMHG | SYSTOLIC BLOOD PRESSURE: 168 MMHG | HEART RATE: 62 BPM

## 2024-06-25 DIAGNOSIS — N53.12 PAINFUL EJACULATION: ICD-10-CM

## 2024-06-25 PROCEDURE — 99213 OFFICE O/P EST LOW 20 MIN: CPT

## 2024-07-03 ENCOUNTER — NON-APPOINTMENT (OUTPATIENT)
Age: 84
End: 2024-07-03

## 2024-12-22 NOTE — HISTORY OF PRESENT ILLNESS
[FreeTextEntry1] : He saw Dr. Mcconnell 2 weeks ago for an 8 pound weight loss and dizziness when waking up.  His blood pressure was low, and Bystolic was discontinued.  Total body scans were negative.  His weight loss was attributed to him stopping smoking marijuana to 3 months ago.\par His dizziness resolved after he stopped Bystolic.\par He denies chest pain, shortness of breath, and palpitations.
never

## 2025-01-14 ENCOUNTER — APPOINTMENT (OUTPATIENT)
Dept: NEUROLOGY | Facility: CLINIC | Age: 85
End: 2025-01-14

## 2025-02-12 ENCOUNTER — APPOINTMENT (OUTPATIENT)
Dept: CARDIOTHORACIC SURGERY | Facility: CLINIC | Age: 85
End: 2025-02-12

## 2025-02-25 ENCOUNTER — OUTPATIENT (OUTPATIENT)
Dept: OUTPATIENT SERVICES | Facility: HOSPITAL | Age: 85
LOS: 1 days | End: 2025-02-25
Payer: COMMERCIAL

## 2025-02-25 ENCOUNTER — APPOINTMENT (OUTPATIENT)
Dept: CT IMAGING | Facility: IMAGING CENTER | Age: 85
End: 2025-02-25
Payer: MEDICARE

## 2025-02-25 DIAGNOSIS — Z95.828 PRESENCE OF OTHER VASCULAR IMPLANTS AND GRAFTS: ICD-10-CM

## 2025-02-25 DIAGNOSIS — Z00.8 ENCOUNTER FOR OTHER GENERAL EXAMINATION: ICD-10-CM

## 2025-02-25 DIAGNOSIS — Z98.890 OTHER SPECIFIED POSTPROCEDURAL STATES: ICD-10-CM

## 2025-02-25 DIAGNOSIS — Z98.89 OTHER SPECIFIED POSTPROCEDURAL STATES: Chronic | ICD-10-CM

## 2025-02-25 DIAGNOSIS — I71.20 THORACIC AORTIC ANEURYSM, WITHOUT RUPTURE, UNSPECIFIED: ICD-10-CM

## 2025-02-25 PROCEDURE — 74176 CT ABD & PELVIS W/O CONTRAST: CPT | Mod: 26

## 2025-02-25 PROCEDURE — 74176 CT ABD & PELVIS W/O CONTRAST: CPT

## 2025-02-25 PROCEDURE — 71250 CT THORAX DX C-: CPT

## 2025-02-25 PROCEDURE — 71250 CT THORAX DX C-: CPT | Mod: 26

## 2025-03-19 ENCOUNTER — APPOINTMENT (OUTPATIENT)
Dept: CARDIOTHORACIC SURGERY | Facility: CLINIC | Age: 85
End: 2025-03-19
Payer: MEDICARE

## 2025-03-19 VITALS
BODY MASS INDEX: 18.83 KG/M2 | HEIGHT: 67 IN | SYSTOLIC BLOOD PRESSURE: 185 MMHG | WEIGHT: 120 LBS | DIASTOLIC BLOOD PRESSURE: 87 MMHG | OXYGEN SATURATION: 99 % | RESPIRATION RATE: 16 BRPM | HEART RATE: 78 BPM

## 2025-03-19 DIAGNOSIS — Z86.79 OTHER SPECIFIED POSTPROCEDURAL STATES: ICD-10-CM

## 2025-03-19 DIAGNOSIS — Z98.890 OTHER SPECIFIED POSTPROCEDURAL STATES: ICD-10-CM

## 2025-03-19 DIAGNOSIS — I77.810 THORACIC AORTIC ECTASIA: ICD-10-CM

## 2025-03-19 DIAGNOSIS — Z95.828 PRESENCE OF OTHER VASCULAR IMPLANTS AND GRAFTS: ICD-10-CM

## 2025-03-19 PROCEDURE — 99215 OFFICE O/P EST HI 40 MIN: CPT

## 2025-04-24 ENCOUNTER — APPOINTMENT (OUTPATIENT)
Dept: MRI IMAGING | Facility: CLINIC | Age: 85
End: 2025-04-24

## 2025-04-24 ENCOUNTER — OUTPATIENT (OUTPATIENT)
Dept: OUTPATIENT SERVICES | Facility: HOSPITAL | Age: 85
LOS: 1 days | End: 2025-04-24
Payer: COMMERCIAL

## 2025-04-24 DIAGNOSIS — Z95.828 PRESENCE OF OTHER VASCULAR IMPLANTS AND GRAFTS: ICD-10-CM

## 2025-04-24 DIAGNOSIS — Z98.89 OTHER SPECIFIED POSTPROCEDURAL STATES: Chronic | ICD-10-CM

## 2025-04-24 DIAGNOSIS — Z00.8 ENCOUNTER FOR OTHER GENERAL EXAMINATION: ICD-10-CM

## 2025-04-24 DIAGNOSIS — I77.810 THORACIC AORTIC ECTASIA: ICD-10-CM

## 2025-04-24 PROCEDURE — 71555 MRI ANGIO CHEST W OR W/O DYE: CPT | Mod: 26

## 2025-04-24 PROCEDURE — C8911: CPT

## 2025-04-24 PROCEDURE — 74185 MRA ABD W OR W/O CNTRST: CPT | Mod: 26

## 2025-04-24 PROCEDURE — 72198 MR ANGIO PELVIS W/O & W/DYE: CPT | Mod: 26

## 2025-04-24 PROCEDURE — A9585: CPT

## 2025-04-24 PROCEDURE — C8902: CPT

## 2025-04-24 PROCEDURE — C8920: CPT

## 2025-06-19 ENCOUNTER — APPOINTMENT (OUTPATIENT)
Dept: NEUROLOGY | Facility: CLINIC | Age: 85
End: 2025-06-19

## 2025-07-22 ENCOUNTER — APPOINTMENT (OUTPATIENT)
Dept: DERMATOLOGY | Facility: CLINIC | Age: 85
End: 2025-07-22
Payer: MEDICARE

## 2025-07-22 VITALS — BODY MASS INDEX: 17.42 KG/M2 | HEIGHT: 67 IN | WEIGHT: 111 LBS

## 2025-07-22 DIAGNOSIS — D22.9 MELANOCYTIC NEVI, UNSPECIFIED: ICD-10-CM

## 2025-07-22 DIAGNOSIS — B35.1 TINEA UNGUIUM: ICD-10-CM

## 2025-07-22 DIAGNOSIS — L57.0 ACTINIC KERATOSIS: ICD-10-CM

## 2025-07-22 DIAGNOSIS — Z12.83 ENCOUNTER FOR SCREENING FOR MALIGNANT NEOPLASM OF SKIN: ICD-10-CM

## 2025-07-22 DIAGNOSIS — L82.1 OTHER SEBORRHEIC KERATOSIS: ICD-10-CM

## 2025-07-22 PROCEDURE — 99203 OFFICE O/P NEW LOW 30 MIN: CPT | Mod: 25

## 2025-07-22 PROCEDURE — 17000 DESTRUCT PREMALG LESION: CPT

## 2025-07-22 PROCEDURE — 17003 DESTRUCT PREMALG LES 2-14: CPT

## 2025-09-12 ENCOUNTER — NON-APPOINTMENT (OUTPATIENT)
Age: 85
End: 2025-09-12

## (undated) DEVICE — SUT MONOCRYL 4-0 27" PS-2 UNDYED

## (undated) DEVICE — NDL COUNTER FOAM AND MAGNET 40-70

## (undated) DEVICE — PACK HYBRID LHH

## (undated) DEVICE — SUT BOOT STANDARD (ORIGINAL YELLOW) 5 PAIR

## (undated) DEVICE — ELCTR STRYKER NEPTUNE SMOKE EVACUATION PENCIL (GREEN)

## (undated) DEVICE — SUT PROLENE 5-0 30" RB-2

## (undated) DEVICE — DVC TORQ PERIF 0.035

## (undated) DEVICE — TUBING SMOKE EVAC 3/8" X 10FT FOR NEPTUNE

## (undated) DEVICE — DRAPE PROBE COVER 5" X 96"

## (undated) DEVICE — MANIFOLD ANGIO AUTOMD TRNDCR

## (undated) DEVICE — SUT PROLENE 4-0 36" RB-1

## (undated) DEVICE — DRSG BIOPATCH DISK W CHG 1" W 4.0MM HOLE

## (undated) DEVICE — DRSG DERMABOND 0.7ML

## (undated) DEVICE — SUT VICRYL 2-0 27" CT-1

## (undated) DEVICE — TUBING BRAT 2 SUCTION ASSEMBLY TWIST LOCK

## (undated) DEVICE — KIT APPLICATOR SPRAY FOR HARVEST SYSTEM

## (undated) DEVICE — CATH TRIOX OXIMETRY 8F 3 LUMENS

## (undated) DEVICE — DRSG KIT CVC TEGADERM ADVANCED

## (undated) DEVICE — DRAPE IOBAN 33" X 23"

## (undated) DEVICE — VASCULAR DILATOR KIT 8,12,16,20, 24FR

## (undated) DEVICE — SUT NUROLON 1 18" OS-8 (POP-OFF)

## (undated) DEVICE — SUT SILK 2-0 18" SH (POP-OFF)

## (undated) DEVICE — PACK PROCEDURE HARVEST SMARTPREP APC-60

## (undated) DEVICE — STAPLER SKIN MULTI DIRECTION W35

## (undated) DEVICE — DRAPE FLUID WARMER 44 X 66"

## (undated) DEVICE — SYS DEL CONTROLLER ANGIOTOUCH

## (undated) DEVICE — GEL AQUSNC PACKET 20GR